# Patient Record
Sex: FEMALE | Race: WHITE | NOT HISPANIC OR LATINO | Employment: OTHER | URBAN - METROPOLITAN AREA
[De-identification: names, ages, dates, MRNs, and addresses within clinical notes are randomized per-mention and may not be internally consistent; named-entity substitution may affect disease eponyms.]

---

## 2017-02-13 ENCOUNTER — TELEPHONE (OUTPATIENT)
Dept: RHEUMATOLOGY | Facility: CLINIC | Age: 42
End: 2017-02-13

## 2017-02-13 NOTE — TELEPHONE ENCOUNTER
Returned patient's call. Patient states she is in a lot of joint pain. Wants to start cosentynx and methotrexate. Will discuss with Dr. Montaño and return patient's call.

## 2017-02-17 ENCOUNTER — TELEPHONE (OUTPATIENT)
Dept: RHEUMATOLOGY | Facility: CLINIC | Age: 42
End: 2017-02-17

## 2017-02-24 ENCOUNTER — TELEPHONE (OUTPATIENT)
Dept: RHEUMATOLOGY | Facility: CLINIC | Age: 42
End: 2017-02-24

## 2017-02-24 NOTE — TELEPHONE ENCOUNTER
Left message to inform pt lab work from last visit needs to be done prior to appt on Friday march 3. Advised to contact office with any questions.

## 2017-02-27 ENCOUNTER — TELEPHONE (OUTPATIENT)
Dept: RHEUMATOLOGY | Facility: CLINIC | Age: 42
End: 2017-02-27

## 2017-02-27 NOTE — TELEPHONE ENCOUNTER
----- Message from Carey Clark sent at 2/27/2017  3:08 PM CST -----  Pt called stated that she need to get a call back from the nurse stated that she needs to be seen before Friday/pls call back at [phone]

## 2017-02-27 NOTE — TELEPHONE ENCOUNTER
Returned pt call regarding needing to be seen sooner than Friday. Informed pt that nothing available other than scheduled appt. Pt verbalized understanding.

## 2017-03-01 ENCOUNTER — TELEPHONE (OUTPATIENT)
Dept: RHEUMATOLOGY | Facility: CLINIC | Age: 42
End: 2017-03-01

## 2017-03-01 NOTE — TELEPHONE ENCOUNTER
----- Message from Josie Simms sent at 2/23/2017  9:15 AM CST -----  Contact: Yanique Mcneill with [employer] [phone] is calling to say that the Cosentyx 150mg needs a prior authorization/please call [phone] for this prior authorization as soon as possible

## 2017-03-02 ENCOUNTER — TELEPHONE (OUTPATIENT)
Dept: RHEUMATOLOGY | Facility: CLINIC | Age: 42
End: 2017-03-02

## 2017-03-02 NOTE — TELEPHONE ENCOUNTER
Attempted to reach patient regarding cosentyx. No answer. lvm for patient to return clinic's call.

## 2017-03-02 NOTE — TELEPHONE ENCOUNTER
----- Message from Samia Morales sent at 3/2/2017 12:26 PM CST -----  Contact: Chelo with [employer]  Cherry with [employer] calling regarding Cosentyx. Chelo states the prescription should go through patient's /[employer] insurance. Please call Chelo at [phone] if any questions. Thanks!

## 2017-03-03 ENCOUNTER — OFFICE VISIT (OUTPATIENT)
Dept: RHEUMATOLOGY | Facility: CLINIC | Age: 42
End: 2017-03-03
Payer: COMMERCIAL

## 2017-03-03 VITALS
SYSTOLIC BLOOD PRESSURE: 113 MMHG | WEIGHT: 105 LBS | DIASTOLIC BLOOD PRESSURE: 75 MMHG | BODY MASS INDEX: 20.51 KG/M2 | HEART RATE: 101 BPM

## 2017-03-03 DIAGNOSIS — M35.1 MIXED CONNECTIVE TISSUE DISEASE: ICD-10-CM

## 2017-03-03 DIAGNOSIS — M45.9 ANKYLOSING SPONDYLITIS: Primary | ICD-10-CM

## 2017-03-03 DIAGNOSIS — L40.50 PSORIATIC ARTHRITIS: ICD-10-CM

## 2017-03-03 DIAGNOSIS — L40.0 PLAQUE PSORIASIS: ICD-10-CM

## 2017-03-03 PROCEDURE — 99999 PR PBB SHADOW E&M-EST. PATIENT-LVL III: CPT | Mod: PBBFAC,,, | Performed by: INTERNAL MEDICINE

## 2017-03-03 PROCEDURE — 99214 OFFICE O/P EST MOD 30 MIN: CPT | Mod: 25,S$GLB,, | Performed by: INTERNAL MEDICINE

## 2017-03-03 PROCEDURE — 1160F RVW MEDS BY RX/DR IN RCRD: CPT | Mod: S$GLB,,, | Performed by: INTERNAL MEDICINE

## 2017-03-03 RX ORDER — IBUPROFEN AND FAMOTIDINE 26.6; 8 MG/1; MG/1
1 TABLET ORAL 3 TIMES DAILY
Qty: 90 TABLET | Refills: 11 | Status: SHIPPED | OUTPATIENT
Start: 2017-03-03 | End: 2017-03-03 | Stop reason: SDUPTHER

## 2017-03-03 RX ORDER — ONDANSETRON 4 MG/1
TABLET, ORALLY DISINTEGRATING ORAL
COMMUNITY
Start: 2013-10-22 | End: 2017-08-25 | Stop reason: SDUPTHER

## 2017-03-03 RX ORDER — IBUPROFEN AND FAMOTIDINE 26.6; 8 MG/1; MG/1
1 TABLET ORAL 3 TIMES DAILY
Qty: 90 TABLET | Refills: 11 | Status: SHIPPED | OUTPATIENT
Start: 2017-03-03

## 2017-03-03 RX ORDER — IBUPROFEN 800 MG/1
800 TABLET ORAL EVERY 4 HOURS PRN
Qty: 120 TABLET | Refills: 3 | Status: CANCELLED | OUTPATIENT
Start: 2017-03-03 | End: 2017-04-02

## 2017-03-03 RX ORDER — METHOTREXATE 2.5 MG/1
25 TABLET ORAL
COMMUNITY
End: 2017-03-03 | Stop reason: SDUPTHER

## 2017-03-03 RX ORDER — OMEPRAZOLE 20 MG/1
20 CAPSULE, DELAYED RELEASE ORAL DAILY
Refills: 1 | COMMUNITY
Start: 2017-02-05

## 2017-03-03 RX ORDER — HYDROMORPHONE HYDROCHLORIDE 12 MG/1
12 TABLET, EXTENDED RELEASE ORAL EVERY 12 HOURS
Refills: 0 | COMMUNITY
Start: 2017-02-03 | End: 2017-12-14 | Stop reason: ALTCHOICE

## 2017-03-03 RX ORDER — IBUPROFEN 800 MG/1
800 TABLET ORAL EVERY 4 HOURS PRN
COMMUNITY
End: 2018-03-01

## 2017-03-03 RX ORDER — METHOTREXATE 2.5 MG/1
25 TABLET ORAL
Qty: 40 TABLET | Refills: 3 | Status: SHIPPED | OUTPATIENT
Start: 2017-03-03 | End: 2017-12-14 | Stop reason: ALTCHOICE

## 2017-03-03 ASSESSMENT — ROUTINE ASSESSMENT OF PATIENT INDEX DATA (RAPID3)
MDHAQ FUNCTION SCORE: 3
PATIENT GLOBAL ASSESSMENT SCORE: 6
PAIN SCORE: 10
TOTAL RAPID3 SCORE: 8.66
WHEN YOU AWAKENED IN THE MORNING OVER THE LAST WEEK, PLEASE INDICATE THE AMOUNT OF TIME IT TAKES UNTIL YOU ARE AS LIMBER AS YOU WILL BE FOR THE DAY: ALL DAY
PSYCHOLOGICAL DISTRESS SCORE: 4.4
AM STIFFNESS SCORE: 1, YES
FATIGUE SCORE: 6

## 2017-03-03 NOTE — MR AVS SNAPSHOT
[facility]  [address] [facility] [address] [place] [address]  Phone: [phone]  Fax: [phone]                  Holli Kurzt   3/3/2017 9:00 AM   Office Visit    Description:  Female : 1975   Provider:  Karson Montaño MD   Department:  [facility]           Reason for Visit     Follow-up           Diagnoses this Visit        Comments    Ankylosing spondylitis    -  Primary     Mixed connective tissue disease         Psoriatic arthritis         Plaque psoriasis                To Do List           Goals (5 Years of Data)     None       These Medications        Disp Refills Start End    methotrexate 2.5 MG Tab 40 tablet 3 3/3/2017 2017    Take 10 tablets (25 mg total) by mouth every 7 days. - Oral    Pharmacy: [facility] [place] - [address] Ph #: [phone]       secukinumab (COSENTYX PEN) 150 mg/mL PnIj 8 mL 1 3/3/2017     Inject 300 mg into the skin every 7 days. - Subcutaneous    Pharmacy: [facility] [place] - [address] Ph #: [phone]       ibuprofen-famotidine (DUEXIS) 800-26.6 mg Tab 90 tablet 11 3/3/2017     Take 1 tablet by mouth 3 (three) times daily. - Oral    [facility] [place] - [address] Ph #: [phone]         [facility]     [facility] -  Assistance  Registered nurses in the [facility] provide clinical advisement, health education, appointment booking, and other advisory services.  Call for this free service at [phone].             Medications           Message regarding Medications     Verify the changes and/or additions to your medication regime listed below are the same as discussed with your clinician today.  If any of these changes or additions are incorrect, please notify your healthcare provider.        START taking these NEW medications        Refills    methotrexate 2.5 MG Tab 3    Sig: Take 10 tablets (25 mg total) by mouth  every 7 days.    Class: Normal    Route: Oral    secukinumab (COSENTYX PEN) 150 mg/mL PnIj 1    Sig: Inject 300 mg into the skin every 7 days.    Class: Print    Route: Subcutaneous    ibuprofen-famotidine (DUEXIS) 800-26.6 mg Tab 11    Sig: Take 1 tablet by mouth 3 (three) times daily.    Class: Normal    Route: Oral      STOP taking these medications     BELBUCA 300 mcg Film APPLY ONE FILM IN MOUTH Q 12 H    diphenhydrAMINE injection 50 mg     folic acid (FOLVITE) 1 MG tablet Take 1 tablet (1 mg total) by mouth once daily.    furosemide (LASIX) 20 MG tablet Take 1 tablet (20 mg total) by mouth once daily.    ketorolac (TORADOL) 60 mg/2 mL Soln Inject 1ml into the muscle every 14 days    leflunomide (ARAVA) 20 MG Tab Take 1 tablet (20 mg total) by mouth once daily.    levocetirizine (XYZAL) 5 MG tablet Take 1 tablet (5 mg total) by mouth every evening.    secukinumab 150 mg/mL Syrg Inject 300 mg into the skin every 7 days.           Verify that the below list of medications is an accurate representation of the medications you are currently taking.  If none reported, the list may be blank. If incorrect, please contact your healthcare provider. Carry this list with you in case of emergency.           Current Medications     ARMOUR THYROID 180 mg Tab Take 180 mg by mouth once daily.    buPROPion (WELLBUTRIN XL) 300 MG 24 hr tablet Take 300 mg by mouth once daily.    fluconazole (DIFLUCAN) 150 MG Tab TK 1 T PO UTD    HYDROmorphone (DILAUDID) 8 MG tablet 8 mg every 4 (four) hours as needed.     methotrexate 2.5 MG Tab Take 10 tablets (25 mg total) by mouth every 7 days.    ondansetron (ZOFRAN-ODT) 4 MG TbDL 1-2 tabs q 6 hrs.    PROGESTERONE,MICRONIZED (PROGESTERONE, BULK,) 100 % Powd C- BIEST(6 4)-PROG-TEST-DHE    promethazine (PHENERGAN) 25 MG tablet every 4 (four) hours as needed.     DUEXIS 800-26.6 mg Tab TK 1 T PO Q 8 H PRN    hydromorphone 12 mg Tb24 Take 12 mg by mouth every 12 (twelve) hours.    ibuprofen  (ADVIL,MOTRIN) 800 MG tablet Take 800 mg by mouth every 4 (four) hours as needed for Pain.    ibuprofen-famotidine (DUEXIS) 800-26.6 mg Tab Take 1 tablet by mouth 3 (three) times daily.    omeprazole (PRILOSEC) 20 MG capsule Take 20 mg by mouth once daily.    secukinumab (COSENTYX PEN) 150 mg/mL PnIj Inject 300 mg into the skin every 7 days.           Clinical Reference Information           Your Vitals Were     BP Pulse Weight BMI       113/75 (BP Location: Left arm, Patient Position: Sitting, BP Method: Automatic) 101 47.6 kg (105 lb) 20.51 kg/m2       Blood Pressure          Most Recent Value    BP  113/75      Allergies as of 3/3/2017     Ativan [Lorazepam]    Latex, Natural Rubber    Versed [Midazolam]    Doxycycline    Remicade [Infliximab]    Sulfamethoxazole-trimethoprim    Vancomycin Analogues    Decadron [Dexamethasone Sodium Phosphate]    Medrol [Methylprednisolone]    Methylprednisolone Sod Phos    Prednisone      Immunizations Administered on Date of Encounter - 3/3/2017     None      Language Assistance Services     ATTENTION: Language assistance services are available, free of charge. Please call [phone].      ATENCIÓN: Si javier weems, tiene a ferreira disposición servicios gratuitos de asistencia lingüística. Llame al [phone].     [facility] Ý: N?u b?n nói Ti?ng Vi?t, có các d?ch v? h? tr? ngôn ng? mi?n phí dành cho b?n. G?i s? [phone].         [facility] complies with applicable Federal civil rights laws and does not discriminate on the basis of race, color, national origin, age, disability, or sex.

## 2017-03-03 NOTE — PROGRESS NOTES
Subjective:       Patient ID: Holli Kurtz is a 41 y.o. female.    Chief Complaint: Follow-up    HPI Comments:  Follow up:  psa and AS and plac psoriasis by biopsy. is flaring especially wrist B, weight has stabilized and though having family issues she is in good spirits.Patient complains of arthralgias and myalgias for which has been present for a few years. Pain is located in multiple joints, both shoulder(s), both elbow(s), both wrist(s), both MCP(s): 1st, 2nd, 3rd, 4th and 5th, both PIP(s): 1st, 2nd, 3rd, 4th and 5th, both DIP(s): 1st and 2nd, both hip(s), both knee(s) and both MTP(s): 1st, 2nd, 3rd, 4th and 5th, is described as aching, pulsating, shooting and throbbing, and is constant, moderate .  Associated symptoms include: crepitation, decreased range of motion, edema, effusion, tenderness and warmth.                  Treatments tried: enbrel, humira, remicaide,      Review of Systems   Constitutional: Positive for activity change. Negative for appetite change, chills, diaphoresis and unexpected weight change.   HENT: Negative for congestion, dental problem, ear discharge, ear pain, facial swelling, mouth sores, nosebleeds, postnasal drip, rhinorrhea, sinus pressure, sneezing, sore throat, tinnitus and voice change.    Eyes: Negative for photophobia, pain, discharge, redness and itching.   Respiratory: Negative for apnea, cough, chest tightness, shortness of breath and wheezing.    Cardiovascular: Positive for leg swelling. Negative for chest pain and palpitations.   Gastrointestinal: Negative for abdominal distention, abdominal pain, constipation, diarrhea, nausea and vomiting.   Endocrine: Negative for cold intolerance, heat intolerance, polydipsia and polyuria.   Genitourinary: Negative for decreased urine volume, difficulty urinating, flank pain, frequency, hematuria and urgency.   Musculoskeletal: Positive for arthralgias, back pain, gait problem and neck pain. Negative for neck stiffness.    Skin: Negative for pallor, rash and wound.   Allergic/Immunologic: Negative for immunocompromised state.   Neurological: Negative for dizziness, tremors, weakness and numbness.   Hematological: Negative for adenopathy. Does not bruise/bleed easily.   Psychiatric/Behavioral: Negative for sleep disturbance. The patient is not nervous/anxious.          Objective:     /75 (BP Location: Left arm, Patient Position: Sitting, BP Method: Automatic)  Pulse 101  Wt 47.6 kg (105 lb)  BMI 20.51 kg/m2     Physical Exam   Nursing note and vitals reviewed.  Constitutional: She is oriented to person, place, and time. She appears distressed.   HENT:   Head: Normocephalic and atraumatic.   Right Ear: External ear normal.   Mouth/Throat: Oropharynx is clear and moist.   Eyes: EOM are normal. Pupils are equal, round, and reactive to light.   Neck: Neck supple. No thyromegaly present.   Cardiovascular: Normal rate, regular rhythm and normal heart sounds.  Exam reveals no gallop and no friction rub.    No murmur heard.  Pulmonary/Chest: Breath sounds normal. She has no wheezes. She has no rales. She exhibits no tenderness.   Abdominal: There is no tenderness. There is no rebound and no guarding.       Right Side Rheumatological Exam     Examination finds the elbow, 1st MCP, 2nd MCP, 3rd MCP, 4th MCP and 5th MCP normal.    The patient is tender to palpation of the shoulder, elbow, wrist, knee, 1st PIP, 1st MCP, 2nd PIP, 2nd MCP, 3rd PIP, 3rd MCP, 4th PIP, 4th MCP, 5th PIP and 5th MCP    She has swelling of the knee, 1st PIP, 1st MCP, 2nd PIP, 2nd MCP, 3rd PIP, 3rd MCP, 4th PIP, 4th MCP, 5th PIP and 5th MCP    The patient has an enlarged wrist, 1st PIP, 2nd PIP, 3rd PIP, 4th PIP and 5th PIP    Shoulder Exam   Tenderness Location: acromion    Range of Motion   Active Abduction: abnormal   Adduction: abnormal  Sensation: normal    Knee Exam   Tenderness Location: medial joint line  Patellofemoral Crepitus: positive  Effusion:  positive  Sensation: normal    Hip Exam   Tenderness Location: no tenderness  Sensation: normal    Elbow/Wrist Exam   Tenderness Location: no tenderness  Sensation: normal    Foot Exam   Right foot exam exhibits signs of inflamed dorsum  Right foot exam exhibits signs of no podagra, no tophus and no plantar fasciitis    Muscle Strength (0-5 scale):  : 4/5     Left Side Rheumatological Exam     Examination finds the elbow, knee, 1st MCP, 2nd MCP, 3rd MCP, 4th MCP and 5th MCP normal.    The patient is tender to palpation of the shoulder, elbow, wrist, knee, 1st PIP, 1st MCP, 2nd PIP, 2nd MCP, 3rd PIP, 3rd MCP, 4th PIP, 4th MCP, 5th PIP and 5th MCP.    She has swelling of the 1st PIP, 1st MCP, 2nd PIP, 2nd MCP, 3rd PIP, 3rd MCP, 4th PIP, 4th MCP, 5th PIP and 5th MCP    The patient has an enlarged wrist, 1st PIP, 2nd PIP, 3rd PIP, 4th PIP and 5th PIP.    Shoulder Exam   Tenderness Location: acromion    Range of Motion   Active Abduction: abnormal   Sensation: normal    Knee Exam   Tenderness Location: lateral joint line and medial joint line    Patellofemoral Crepitus: positive  Effusion: positive  Sensation: normal    Hip Exam   Tenderness Location: no tenderness  Sensation: normal    Elbow/Wrist Exam   Tenderness Location: lateral epicondyle and medial epicondyle  Sensation: normal    Foot Exam   Left foot exam exhibits signs of inflamed dorsum  Left foot exam exhibits signs of no podagra and no plantar fasciitis    Muscle Strength (0-5 scale):  :  4/5       Back/Neck Exam   General Inspection   Gait: normal       Tenderness Right paramedian tenderness of the Lower C-Spine and Lower L-Spine.Left paramedian tenderness of the Upper C-Spine and Lower L-Spine.      Comments:  15 out of 18 tender points    Lymphadenopathy:     She has no cervical adenopathy.   Neurological: She is alert and oriented to person, place, and time.   Reflex Scores:       Patellar reflexes are 3+ on the right side and 3+ on the left  side.  Skin: No rash noted. No erythema. No pallor.     Psychiatric: Mood and affect normal.   Musculoskeletal: She exhibits edema, tenderness and deformity.   ra changes on hands pips and B wrist              hla b 27 pos,  Ankit 1;320            Assessment:         Encounter Diagnoses   Name Primary?    Ankylosing spondylitis Yes    Mixed connective tissue disease     Psoriatic arthritis     Plaque psoriasis          Plan:   Ankylosing spondylitis  -     methotrexate 2.5 MG Tab; Take 10 tablets (25 mg total) by mouth every 7 days.  Dispense: 40 tablet; Refill: 3  -     secukinumab (COSENTYX PEN) 150 mg/mL PnIj; Inject 300 mg into the skin every 7 days.  Dispense: 8 mL; Refill: 1  -     Discontinue: ibuprofen-famotidine (DUEXIS) 800-26.6 mg Tab; Take 1 tablet by mouth 3 (three) times daily.  Dispense: 90 tablet; Refill: 11  -     ibuprofen-famotidine (DUEXIS) 800-26.6 mg Tab; Take 1 tablet by mouth 3 (three) times daily.  Dispense: 90 tablet; Refill: 11  -     ANKIT; Future; Expected date: 3/3/17  -     Anti Sm/RNP Antibody; Future; Expected date: 3/3/17  -     Sjogrens syndrome-A extractable nuclear antibody; Future; Expected date: 3/3/17  -     Anti-scleroderma antibody; Future; Expected date: 3/3/17  -     Anti-Smith antibody; Future; Expected date: 3/3/17  -     Anti-smooth muscle antibody; Future; Expected date: 3/3/17  -     ANTI-HISTONE ANTIBODY; Future; Expected date: 3/3/17  -     ANTI-SSB ANTIBODY; Future; Expected date: 3/3/17  -     secukinumab (COSENTYX, 2 SYRINGES,) 150 mg/mL Syrg; Inject 300 mg into the skin once a week.  Dispense: 2 Syringe; Refill: 0    Mixed connective tissue disease  -     methotrexate 2.5 MG Tab; Take 10 tablets (25 mg total) by mouth every 7 days.  Dispense: 40 tablet; Refill: 3  -     secukinumab (COSENTYX PEN) 150 mg/mL PnIj; Inject 300 mg into the skin every 7 days.  Dispense: 8 mL; Refill: 1  -     Discontinue: ibuprofen-famotidine (DUEXIS) 800-26.6 mg Tab; Take 1 tablet by  mouth 3 (three) times daily.  Dispense: 90 tablet; Refill: 11  -     ibuprofen-famotidine (DUEXIS) 800-26.6 mg Tab; Take 1 tablet by mouth 3 (three) times daily.  Dispense: 90 tablet; Refill: 11  -     ANKIT; Future; Expected date: 3/3/17  -     Anti Sm/RNP Antibody; Future; Expected date: 3/3/17  -     Sjogrens syndrome-A extractable nuclear antibody; Future; Expected date: 3/3/17  -     Anti-scleroderma antibody; Future; Expected date: 3/3/17  -     Anti-Smith antibody; Future; Expected date: 3/3/17  -     Anti-smooth muscle antibody; Future; Expected date: 3/3/17  -     ANTI-HISTONE ANTIBODY; Future; Expected date: 3/3/17  -     ANTI-SSB ANTIBODY; Future; Expected date: 3/3/17  -     secukinumab (COSENTYX, 2 SYRINGES,) 150 mg/mL Syrg; Inject 300 mg into the skin once a week.  Dispense: 2 Syringe; Refill: 0    Psoriatic arthritis  -     Discontinue: ibuprofen-famotidine (DUEXIS) 800-26.6 mg Tab; Take 1 tablet by mouth 3 (three) times daily.  Dispense: 90 tablet; Refill: 11  -     ibuprofen-famotidine (DUEXIS) 800-26.6 mg Tab; Take 1 tablet by mouth 3 (three) times daily.  Dispense: 90 tablet; Refill: 11  -     ANKIT; Future; Expected date: 3/3/17  -     Anti Sm/RNP Antibody; Future; Expected date: 3/3/17  -     Sjogrens syndrome-A extractable nuclear antibody; Future; Expected date: 3/3/17  -     Anti-scleroderma antibody; Future; Expected date: 3/3/17  -     Anti-Smith antibody; Future; Expected date: 3/3/17  -     Anti-smooth muscle antibody; Future; Expected date: 3/3/17  -     ANTI-HISTONE ANTIBODY; Future; Expected date: 3/3/17  -     ANTI-SSB ANTIBODY; Future; Expected date: 3/3/17  -     secukinumab (COSENTYX, 2 SYRINGES,) 150 mg/mL Syrg; Inject 300 mg into the skin once a week.  Dispense: 2 Syringe; Refill: 0    Plaque psoriasis  -     ANKIT; Future; Expected date: 3/3/17  -     Anti Sm/RNP Antibody; Future; Expected date: 3/3/17  -     Sjogrens syndrome-A extractable nuclear antibody; Future; Expected date:  3/3/17  -     Anti-scleroderma antibody; Future; Expected date: 3/3/17  -     Anti-Smith antibody; Future; Expected date: 3/3/17  -     Anti-smooth muscle antibody; Future; Expected date: 3/3/17  -     ANTI-HISTONE ANTIBODY; Future; Expected date: 3/3/17  -     ANTI-SSB ANTIBODY; Future; Expected date: 3/3/17  -     secukinumab (COSENTYX, 2 SYRINGES,) 150 mg/mL Syrg; Inject 300 mg into the skin once a week.  Dispense: 2 Syringe; Refill: 0    Other orders  -     Cancel: ibuprofen (ADVIL,MOTRIN) 800 MG tablet; Take 1 tablet (800 mg total) by mouth every 4 (four) hours as needed for Pain.  Dispense: 120 tablet; Refill: 3     Decrease ivig, dc acthar ivp, anna xeljanz started injectable actemera and will switch to iv actemera  Concerned about her anemia

## 2017-03-21 ENCOUNTER — TELEPHONE (OUTPATIENT)
Dept: INFUSION THERAPY | Facility: HOSPITAL | Age: 42
End: 2017-03-21

## 2017-03-21 ENCOUNTER — TELEPHONE (OUTPATIENT)
Dept: RHEUMATOLOGY | Facility: CLINIC | Age: 42
End: 2017-03-21

## 2017-03-21 DIAGNOSIS — D50.9 IRON DEFICIENCY ANEMIA, UNSPECIFIED IRON DEFICIENCY ANEMIA TYPE: ICD-10-CM

## 2017-03-21 RX ORDER — SODIUM CHLORIDE 0.9 % (FLUSH) 0.9 %
10 SYRINGE (ML) INJECTION
Status: CANCELLED | OUTPATIENT
Start: 2017-03-21

## 2017-03-21 RX ORDER — HEPARIN SODIUM (PORCINE) LOCK FLUSH IV SOLN 100 UNIT/ML 100 UNIT/ML
100 SOLUTION INTRAVENOUS
Status: CANCELLED | OUTPATIENT
Start: 2017-03-21

## 2017-03-21 NOTE — TELEPHONE ENCOUNTER
Dr. Montaño placed order for iron infusion. We need to get pt in asap.     Thanks,    Axel Marvin  [id number]

## 2017-03-21 NOTE — TELEPHONE ENCOUNTER
----- Message from Karson Montaño MD sent at 3/21/2017  8:02 AM CDT -----  Your h/h very lo w , you need iron, i will set it up and inform Dr Bellamy

## 2017-03-21 NOTE — TELEPHONE ENCOUNTER
Spoke to patient verbally, advised of results and to expect a call from infusion. Pt verbalized understanding.

## 2017-03-21 NOTE — TELEPHONE ENCOUNTER
Pt advised that we received approval and that currently  the next available appt we have is 3/27, if we have any cancellations I will call to get her in sooner.  Pt verbalized understanding

## 2017-03-21 NOTE — TELEPHONE ENCOUNTER
Pt called to see why she hasn't been called about scheduling her iron infusion as she was under the impression that she needed to come in today. Pt advised that the order was entered this morning and is still pending insurance approval. Pt stated she would call her insurance company.

## 2017-03-22 NOTE — TELEPHONE ENCOUNTER
Pt scheduled for infusion tomorrow 3/23 at 10:30am, Aylin Corcoran stated she would call the pt and let her know about appt

## 2017-03-22 NOTE — TELEPHONE ENCOUNTER
----- Message from Alberto Aguila sent at 3/21/2017  4:08 PM CDT -----  Contact: same  Unsuccessful call placed to pod.  Patient called in and wanted to let Dr. Montaño know that she has not gotten a call back about scheduling her STAT iron infusion.    Patient call back number is [phone]

## 2017-03-22 NOTE — TELEPHONE ENCOUNTER
Returned patient's call. Attempted to contact [facility] infusion department to get iron infusion at a sooner date.

## 2017-03-23 ENCOUNTER — INFUSION (OUTPATIENT)
Dept: INFUSION THERAPY | Facility: HOSPITAL | Age: 42
End: 2017-03-23
Attending: INTERNAL MEDICINE
Payer: COMMERCIAL

## 2017-03-23 VITALS
SYSTOLIC BLOOD PRESSURE: 137 MMHG | DIASTOLIC BLOOD PRESSURE: 79 MMHG | BODY MASS INDEX: 20.53 KG/M2 | RESPIRATION RATE: 17 BRPM | HEART RATE: 90 BPM | TEMPERATURE: 99 F | WEIGHT: 105.13 LBS

## 2017-03-23 DIAGNOSIS — D83.9 CVID (COMMON VARIABLE IMMUNODEFICIENCY): ICD-10-CM

## 2017-03-23 DIAGNOSIS — R11.0 NAUSEA: Primary | ICD-10-CM

## 2017-03-23 DIAGNOSIS — D50.9 IRON DEFICIENCY ANEMIA, UNSPECIFIED IRON DEFICIENCY ANEMIA TYPE: ICD-10-CM

## 2017-03-23 DIAGNOSIS — M06.9 RHEUMATOID ARTHRITIS OF HAND, UNSPECIFIED LATERALITY, UNSPECIFIED RHEUMATOID FACTOR PRESENCE: ICD-10-CM

## 2017-03-23 DIAGNOSIS — M33.20 IDIOPATHIC POLYMYOSITIS: ICD-10-CM

## 2017-03-23 PROCEDURE — 96376 TX/PRO/DX INJ SAME DRUG ADON: CPT | Mod: PN

## 2017-03-23 PROCEDURE — 63600175 PHARM REV CODE 636 W HCPCS: Mod: PN | Performed by: INTERNAL MEDICINE

## 2017-03-23 PROCEDURE — 25000003 PHARM REV CODE 250: Mod: PN | Performed by: INTERNAL MEDICINE

## 2017-03-23 PROCEDURE — 96365 THER/PROPH/DIAG IV INF INIT: CPT | Mod: PN

## 2017-03-23 RX ORDER — SODIUM CHLORIDE 0.9 % (FLUSH) 0.9 %
10 SYRINGE (ML) INJECTION
Status: CANCELLED | OUTPATIENT
Start: 2017-03-23

## 2017-03-23 RX ORDER — ONDANSETRON 2 MG/ML
8 INJECTION INTRAMUSCULAR; INTRAVENOUS ONCE
Status: COMPLETED | OUTPATIENT
Start: 2017-03-23 | End: 2017-03-23

## 2017-03-23 RX ORDER — SODIUM CHLORIDE 0.9 % (FLUSH) 0.9 %
10 SYRINGE (ML) INJECTION
Status: DISCONTINUED | OUTPATIENT
Start: 2017-03-23 | End: 2017-03-23 | Stop reason: HOSPADM

## 2017-03-23 RX ORDER — HEPARIN SODIUM (PORCINE) LOCK FLUSH IV SOLN 100 UNIT/ML 100 UNIT/ML
100 SOLUTION INTRAVENOUS
Status: CANCELLED | OUTPATIENT
Start: 2017-03-23

## 2017-03-23 RX ADMIN — ONDANSETRON 8 MG: 2 INJECTION, SOLUTION INTRAMUSCULAR; INTRAVENOUS at 02:03

## 2017-03-23 RX ADMIN — SODIUM CHLORIDE, PRESERVATIVE FREE 10 ML: 5 INJECTION INTRAVENOUS at 11:03

## 2017-03-23 RX ADMIN — IRON SUCROSE 100 MG: 20 INJECTION, SOLUTION INTRAVENOUS at 12:03

## 2017-03-23 NOTE — PLAN OF CARE
Problem: Patient Care Overview  Goal: Plan of Care Review  Outcome: Ongoing (interventions implemented as appropriate)  Pt. Completed treatment, tolerated without noted distress. Pt admits to some continuing nausea but reports at a minimal amount.Vtial signs stable. Patient discharged from infusion center via WC with caregiver. Pt to contact Dr. Montaño tomorrow if nausea persists. Patient had all present questions answered.

## 2017-03-23 NOTE — NURSING
1340 Pt reports feeling nauseated. Infusion stopped, vital signs taken and blue bag provided to pt. Cool cloth to back of neck provided to pt. Pt denies any abdominal or back pain. Denies CP/ SOB, dizziness. No noted rash.  1345 Pt states she is feeling less nauseated and continues to sit on edge of chair.  1355 Phoned and reported episode on nausea , vital signs and assessment to Dr. Montaño's office. Phone order for zofran.

## 2017-03-27 ENCOUNTER — TELEPHONE (OUTPATIENT)
Dept: RHEUMATOLOGY | Facility: CLINIC | Age: 42
End: 2017-03-27

## 2017-03-27 ENCOUNTER — INFUSION (OUTPATIENT)
Dept: INFUSION THERAPY | Facility: HOSPITAL | Age: 42
End: 2017-03-27
Attending: INTERNAL MEDICINE
Payer: COMMERCIAL

## 2017-03-27 VITALS
DIASTOLIC BLOOD PRESSURE: 76 MMHG | TEMPERATURE: 99 F | BODY MASS INDEX: 20.55 KG/M2 | SYSTOLIC BLOOD PRESSURE: 122 MMHG | HEART RATE: 90 BPM | WEIGHT: 105.19 LBS | RESPIRATION RATE: 16 BRPM

## 2017-03-27 DIAGNOSIS — D50.9 IRON DEFICIENCY ANEMIA, UNSPECIFIED IRON DEFICIENCY ANEMIA TYPE: Primary | ICD-10-CM

## 2017-03-27 DIAGNOSIS — M06.9 RHEUMATOID ARTHRITIS OF HAND, UNSPECIFIED LATERALITY, UNSPECIFIED RHEUMATOID FACTOR PRESENCE: ICD-10-CM

## 2017-03-27 DIAGNOSIS — D83.9 CVID (COMMON VARIABLE IMMUNODEFICIENCY): ICD-10-CM

## 2017-03-27 DIAGNOSIS — M33.20 IDIOPATHIC POLYMYOSITIS: ICD-10-CM

## 2017-03-27 DIAGNOSIS — D64.9 ANEMIA, UNSPECIFIED TYPE: Primary | ICD-10-CM

## 2017-03-27 PROCEDURE — 25000003 PHARM REV CODE 250: Mod: PN | Performed by: INTERNAL MEDICINE

## 2017-03-27 PROCEDURE — 96365 THER/PROPH/DIAG IV INF INIT: CPT | Mod: PN

## 2017-03-27 PROCEDURE — 63600175 PHARM REV CODE 636 W HCPCS: Mod: PN | Performed by: INTERNAL MEDICINE

## 2017-03-27 RX ORDER — SODIUM CHLORIDE 0.9 % (FLUSH) 0.9 %
10 SYRINGE (ML) INJECTION
Status: DISCONTINUED | OUTPATIENT
Start: 2017-03-27 | End: 2017-03-27 | Stop reason: HOSPADM

## 2017-03-27 RX ORDER — ONDANSETRON 2 MG/ML
8 INJECTION INTRAMUSCULAR; INTRAVENOUS ONCE AS NEEDED
Status: CANCELLED
Start: 2017-03-28 | End: 2017-03-28

## 2017-03-27 RX ORDER — ONDANSETRON 2 MG/ML
8 INJECTION INTRAMUSCULAR; INTRAVENOUS ONCE AS NEEDED
Status: CANCELLED
Start: 2017-03-27 | End: 2017-03-27

## 2017-03-27 RX ORDER — HEPARIN SODIUM (PORCINE) LOCK FLUSH IV SOLN 100 UNIT/ML 100 UNIT/ML
100 SOLUTION INTRAVENOUS
Status: CANCELLED | OUTPATIENT
Start: 2017-03-27

## 2017-03-27 RX ORDER — HEPARIN 100 UNIT/ML
100 SYRINGE INTRAVENOUS
Status: DISCONTINUED | OUTPATIENT
Start: 2017-03-27 | End: 2017-03-27 | Stop reason: HOSPADM

## 2017-03-27 RX ORDER — SODIUM CHLORIDE 0.9 % (FLUSH) 0.9 %
10 SYRINGE (ML) INJECTION
Status: CANCELLED | OUTPATIENT
Start: 2017-03-27

## 2017-03-27 RX ADMIN — SODIUM CHLORIDE, PRESERVATIVE FREE 10 ML: 5 INJECTION INTRAVENOUS at 01:03

## 2017-03-27 RX ADMIN — SODIUM CHLORIDE: 0.9 INJECTION, SOLUTION INTRAVENOUS at 01:03

## 2017-03-27 RX ADMIN — IRON SUCROSE 100 MG: 20 INJECTION, SOLUTION INTRAVENOUS at 01:03

## 2017-03-27 NOTE — MR AVS SNAPSHOT
Patient Information     Patient Name Sex Holli Cooney Female 1975      Visit Information        Provider Department Dept Phone Center    3/27/2017 12:30 PM CHAIR 16, [facility] [phone] [facility]      Patient Instructions     None      Your Current Medications Are     ARMOUR THYROID 180 mg Tab    buPROPion (WELLBUTRIN XL) 300 MG 24 hr tablet    DUEXIS 800-26.6 mg Tab    fluconazole (DIFLUCAN) 150 MG Tab    HYDROmorphone (DILAUDID) 8 MG tablet    hydromorphone 12 mg Tb24    ibuprofen (ADVIL,MOTRIN) 800 MG tablet    ibuprofen-famotidine (DUEXIS) 800-26.6 mg Tab    methotrexate 2.5 MG Tab    omeprazole (PRILOSEC) 20 MG capsule    ondansetron (ZOFRAN-ODT) 4 MG TbDL    PROGESTERONE,MICRONIZED (PROGESTERONE, BULK,) 100 % Powd    promethazine (PHENERGAN) 25 MG tablet    secukinumab (COSENTYX PEN) 150 mg/mL PnIj      Facility-Administered Medications     heparin, porcine (PF) 100 unit/mL injection flush 100 Units    iron sucrose (VENOFER) 100 mg in sodium chloride 0.9% 100 mL IVPB    sodium chloride 0.9% 100 mL flush bag    sodium chloride 0.9% flush 10 mL      Appointments for Next Year     2017 10:00 AM ESTABLISHED PATIENT (30 min.) [facility] Karson Montaño MD    Arrive at check-in approximately 15 minutes before your scheduled appointment time. Bring all outside medical records and imaging, along with a list of your current medications and insurance card.         Default Flowsheet Data (last 24 hours)      Amb Complex Vitals Severo        17 1258                Measurements    Weight 47.7 kg (105 lb 3.2 oz)        /78        Temp 98.5 °F (36.9 °C)        Pulse 92        Resp 16        Pain Assessment    Pain Score Eight        Pain Frequency 2 Continuous        Pain Loc GENERALIZED   every joint in body                Allergies     Ativan [Lorazepam] Other (See Comments)    hallucinations    Latex, Natural Rubber Other  (See Comments)    blisters    Versed [Midazolam] Anxiety    Hyperactive behavior    Doxycycline     Remicade [Infliximab] Diarrhea, Nausea And Vomiting, Other (See Comments)    Severe abdominal cramping.    Sulfamethoxazole-trimethoprim     Vancomycin Analogues     Decadron [Dexamethasone Sodium Phosphate] Palpitations     tachy    Medrol [Methylprednisolone] Palpitations    Methylprednisolone Sod Phos Palpitations    Prednisone Palpitations    tachycardia      Medications You Received from 03/26/2017 1513 to 03/27/2017 1513        Date/Time Order Dose Route Action     03/27/2017 1319 iron sucrose (VENOFER) 100 mg in sodium chloride 0.9% 100 mL IVPB 100 mg Intravenous New Bag     03/27/2017 1319 sodium chloride 0.9% 100 mL flush bag   Intravenous New Bag     03/27/2017 1319 sodium chloride 0.9% flush 10 mL 10 mL Intravenous Given      Current Discharge Medication List     Cannot display discharge medications since this is not an admission.

## 2017-03-27 NOTE — NURSING
Sully Hdez 1:39 PM:   Hi there! I have alonzo tejada mrn [id number] here today for venofer. She got really sick with last venofer can I prophylactically give her zofran 8mg IV push? Also they want to know if she needs her Cbc rechecked bc her hgb and hct are down from 3/20       Shavonne Miramontes 1:41 PM:   I'm trying to reach Danyel now.   Sully Hdez 1:42 PM:   thanks   Shavonne Miramontes 2:19 PM:   did someone get back with you on her?   Sully Hdez 2:31 PM:   No debbie   Shavonne Miramontes 3:01 PM:   Ok to give. Danyel put the orders in. yes, reccheck cbc.   Sully Hdez 3:04 PM:   the pt just got out of my chair. Would you like me to call her to come back for cbc?   3:04 PM This message wasn't sent to Shavonne Miramontes.   the pt just got out of my chair. Would you like me to call her to come back for cbc?

## 2017-03-27 NOTE — PLAN OF CARE
Problem: Patient Care Overview  Goal: Plan of Care Review  Outcome: Ongoing (interventions implemented as appropriate)  Pt tolerated  infusion without any difficulty. Instructed pt to call for questions or concerns and to return to clinic as directed for next treatment. Pt verbalized understanding. AVS printed and schedule given to pt

## 2017-03-29 ENCOUNTER — TELEPHONE (OUTPATIENT)
Dept: RHEUMATOLOGY | Facility: CLINIC | Age: 42
End: 2017-03-29

## 2017-03-29 DIAGNOSIS — D50.9 IRON DEFICIENCY ANEMIA, UNSPECIFIED IRON DEFICIENCY ANEMIA TYPE: Primary | ICD-10-CM

## 2017-03-29 NOTE — TELEPHONE ENCOUNTER
Spoke with patient and message and instructions given. Patient states that she had her infusion on Monday, has an appointment with Dr. Bellamy on 04/10/17 and will have her labs drawn at [facility]. Orders are in the system.

## 2017-03-29 NOTE — TELEPHONE ENCOUNTER
Spoke with patient and states that she had her Infusion on Monday and needs an order for a CBC at [facility].

## 2017-04-10 PROBLEM — D89.2 PARAPROTEINEMIA: Status: ACTIVE | Noted: 2017-04-10

## 2017-04-10 NOTE — TELEPHONE ENCOUNTER
----- Message from Laya Singh sent at 4/10/2017  1:07 PM CDT -----  Patient stated she is having reaction to medication/stated has rash all over arms,chest and face/needs advice/please call back at [phone] to advise.

## 2017-04-10 NOTE — TELEPHONE ENCOUNTER
Medrol dosepack called to pharmacy. Patient instructed to hold cosentynx and to take benadryl and medrol dosepack. Also to [facility].

## 2017-04-11 ENCOUNTER — PATIENT MESSAGE (OUTPATIENT)
Dept: RHEUMATOLOGY | Facility: CLINIC | Age: 42
End: 2017-04-11

## 2017-04-11 RX ORDER — METHYLPREDNISOLONE 4 MG/1
TABLET ORAL
Qty: 1 PACKAGE | Refills: 0 | Status: SHIPPED | OUTPATIENT
Start: 2017-04-11 | End: 2017-05-01

## 2017-04-28 ENCOUNTER — PATIENT MESSAGE (OUTPATIENT)
Dept: RHEUMATOLOGY | Facility: CLINIC | Age: 42
End: 2017-04-28

## 2017-04-29 ENCOUNTER — PATIENT MESSAGE (OUTPATIENT)
Dept: RHEUMATOLOGY | Facility: CLINIC | Age: 42
End: 2017-04-29

## 2017-05-08 PROBLEM — E61.1 IRON DEFICIENCY: Status: ACTIVE | Noted: 2017-05-08

## 2017-05-31 ENCOUNTER — TELEPHONE (OUTPATIENT)
Dept: RHEUMATOLOGY | Facility: CLINIC | Age: 42
End: 2017-05-31

## 2017-05-31 NOTE — TELEPHONE ENCOUNTER
----- Message from Josie Simms sent at 5/31/2017  1:21 PM CDT -----  Contact: Lindsay Montoya - Pharmacist with [facility] - [phone] is calling to clarify patient's prescription/pharmacist did not want to give me the name of the prescription/please call

## 2017-06-12 ENCOUNTER — PATIENT MESSAGE (OUTPATIENT)
Dept: RHEUMATOLOGY | Facility: CLINIC | Age: 42
End: 2017-06-12

## 2017-06-13 NOTE — TELEPHONE ENCOUNTER
Received appeal documentation. Faxed to [facility] pharmacy along with visit notes.

## 2017-06-15 ENCOUNTER — TELEPHONE (OUTPATIENT)
Dept: RHEUMATOLOGY | Facility: CLINIC | Age: 42
End: 2017-06-15

## 2017-06-15 NOTE — TELEPHONE ENCOUNTER
Returned [facility]'s call. Pharmacist states stelara is absolutely not covered at all. PA and Appeal is not an option.

## 2017-06-15 NOTE — TELEPHONE ENCOUNTER
----- Message from Carmen Bains sent at 6/15/2017 12:55 PM CDT -----  Contact: Nyla from [facility] [phone]  Call placed to pod Nyla Lemons called and asked for a call back about this patient. Thank you.

## 2017-06-21 ENCOUNTER — PATIENT MESSAGE (OUTPATIENT)
Dept: RHEUMATOLOGY | Facility: CLINIC | Age: 42
End: 2017-06-21

## 2017-07-25 ENCOUNTER — PATIENT MESSAGE (OUTPATIENT)
Dept: RHEUMATOLOGY | Facility: CLINIC | Age: 42
End: 2017-07-25

## 2017-07-25 DIAGNOSIS — M06.9 RHEUMATOID ARTHRITIS, INVOLVING UNSPECIFIED SITE, UNSPECIFIED RHEUMATOID FACTOR PRESENCE: Primary | ICD-10-CM

## 2017-07-27 NOTE — TELEPHONE ENCOUNTER
Before we start recycling old meds lets try the newer meds out there, we can try kevzara and if its not approved we can attempt to get it for free. It is more of a RA medication. You are anemic so methotrexate is not a good option but arava can be restarted. I will send the script tonight

## 2017-07-31 ENCOUNTER — OFFICE VISIT (OUTPATIENT)
Dept: RHEUMATOLOGY | Facility: CLINIC | Age: 42
End: 2017-07-31
Payer: COMMERCIAL

## 2017-07-31 VITALS
HEART RATE: 95 BPM | BODY MASS INDEX: 23.44 KG/M2 | WEIGHT: 120 LBS | DIASTOLIC BLOOD PRESSURE: 88 MMHG | SYSTOLIC BLOOD PRESSURE: 135 MMHG

## 2017-07-31 DIAGNOSIS — M45.9 ANKYLOSING SPONDYLITIS, UNSPECIFIED SITE OF SPINE: ICD-10-CM

## 2017-07-31 DIAGNOSIS — M06.9 RHEUMATOID ARTHRITIS, INVOLVING UNSPECIFIED SITE, UNSPECIFIED RHEUMATOID FACTOR PRESENCE: Primary | ICD-10-CM

## 2017-07-31 DIAGNOSIS — L40.50 PSORIATIC ARTHRITIS: ICD-10-CM

## 2017-07-31 DIAGNOSIS — F06.4 ANXIETY DISORDER DUE TO GENERAL MEDICAL CONDITION: ICD-10-CM

## 2017-07-31 PROCEDURE — 99999 PR PBB SHADOW E&M-EST. PATIENT-LVL III: CPT | Mod: PBBFAC,,, | Performed by: INTERNAL MEDICINE

## 2017-07-31 PROCEDURE — 3008F BODY MASS INDEX DOCD: CPT | Mod: S$GLB,,, | Performed by: INTERNAL MEDICINE

## 2017-07-31 PROCEDURE — 99215 OFFICE O/P EST HI 40 MIN: CPT | Mod: S$GLB,,, | Performed by: INTERNAL MEDICINE

## 2017-07-31 RX ORDER — HYDROXYZINE PAMOATE 25 MG/1
CAPSULE ORAL
Refills: 1 | COMMUNITY
Start: 2017-06-07 | End: 2020-03-23

## 2017-07-31 RX ORDER — HYDROCORTISONE 25 MG/G
CREAM TOPICAL
Refills: 0 | COMMUNITY
Start: 2017-05-15

## 2017-07-31 RX ORDER — DEXAMETHASONE 0.5 MG/1
0.5 TABLET ORAL DAILY
Qty: 30 TABLET | Refills: 1 | Status: SHIPPED | OUTPATIENT
Start: 2017-07-31 | End: 2017-08-30

## 2017-07-31 RX ORDER — KETOCONAZOLE 20 MG/ML
SHAMPOO, SUSPENSION TOPICAL
Refills: 0 | COMMUNITY
Start: 2017-05-15

## 2017-07-31 RX ORDER — BUPROPION HYDROCHLORIDE 150 MG/1
150 TABLET ORAL DAILY
Qty: 30 TABLET | Refills: 11 | Status: SHIPPED | OUTPATIENT
Start: 2017-07-31 | End: 2017-11-06 | Stop reason: DRUGHIGH

## 2017-07-31 RX ORDER — FLUOCINONIDE 0.5 MG/G
OINTMENT TOPICAL
Refills: 0 | COMMUNITY
Start: 2017-05-15 | End: 2020-03-23 | Stop reason: SDUPTHER

## 2017-07-31 RX ORDER — PROMETHAZINE HYDROCHLORIDE 25 MG/1
25 TABLET ORAL EVERY 4 HOURS PRN
Qty: 30 TABLET | Refills: 3 | Status: SHIPPED | OUTPATIENT
Start: 2017-07-31 | End: 2020-03-23 | Stop reason: SDUPTHER

## 2017-07-31 ASSESSMENT — ROUTINE ASSESSMENT OF PATIENT INDEX DATA (RAPID3)
FATIGUE SCORE: 6
PSYCHOLOGICAL DISTRESS SCORE: 4.4
PAIN SCORE: 10
AM STIFFNESS SCORE: 1, YES
TOTAL RAPID3 SCORE: 8
MDHAQ FUNCTION SCORE: 3
PATIENT GLOBAL ASSESSMENT SCORE: 4
WHEN YOU AWAKENED IN THE MORNING OVER THE LAST WEEK, PLEASE INDICATE THE AMOUNT OF TIME IT TAKES UNTIL YOU ARE AS LIMBER AS YOU WILL BE FOR THE DAY: ALL DAY

## 2017-07-31 NOTE — PROGRESS NOTES
Subjective:       Patient ID: Holli Kurtz is a 41 y.o. female.    Chief Complaint: Follow-up     Follow up: Ra, psa and AS and plac psoriasis  is flaring especially wrist B, she has noticed teeth shifting and bone growth. She has severe difficulty transferring from wheelchair to wheelchair with falls.  She is doing poorly. Patient complains of arthralgias and myalgias for which has been present for a few years. Pain is located in multiple joints, both shoulder(s), both elbow(s), both wrist(s), both MCP(s): 1st, 2nd, 3rd, 4th and 5th, both PIP(s): 1st, 2nd, 3rd, 4th and 5th, both DIP(s): 1st and 2nd, both hip(s), both knee(s) and both MTP(s): 1st, 2nd, 3rd, 4th and 5th, is described as aching, pulsating, shooting and throbbing, and is constant, moderate .  Associated symptoms include: crepitation, decreased range of motion, edema, effusion, tenderness and warmth.                She complains of joint swelling. Associated symptoms include fatigue and myalgias.     Treatments tried: enbrel, humira, remicaide,      Review of Systems   Constitutional: Positive for activity change and fatigue. Negative for appetite change, chills, diaphoresis and unexpected weight change.   HENT: Negative for congestion, dental problem, ear discharge, ear pain, facial swelling, mouth sores, nosebleeds, postnasal drip, rhinorrhea, sinus pressure, sneezing, sore throat, tinnitus and voice change.    Eyes: Negative for photophobia, pain, discharge, redness and itching.   Respiratory: Negative for apnea, cough, chest tightness, shortness of breath and wheezing.    Cardiovascular: Positive for leg swelling. Negative for chest pain and palpitations.   Gastrointestinal: Negative for abdominal distention, abdominal pain, constipation, diarrhea, nausea and vomiting.   Endocrine: Negative for cold intolerance, heat intolerance, polydipsia and polyuria.   Genitourinary: Negative for decreased urine volume, difficulty urinating, flank  pain, frequency, hematuria and urgency.   Musculoskeletal: Positive for arthralgias, back pain, gait problem, joint swelling, myalgias and neck pain. Negative for neck stiffness.   Skin: Negative for pallor, rash and wound.   Allergic/Immunologic: Negative for immunocompromised state.   Neurological: Negative for dizziness, tremors, weakness and numbness.   Hematological: Negative for adenopathy. Does not bruise/bleed easily.   Psychiatric/Behavioral: Negative for sleep disturbance. The patient is not nervous/anxious.          Objective:     /88 (BP Location: Left arm, Patient Position: Sitting, BP Method: Automatic)   Pulse 95   Wt 54.4 kg (120 lb)   BMI 23.44 kg/m²      Physical Exam   Nursing note and vitals reviewed.  Constitutional: She is oriented to person, place, and time. She appears distressed.   HENT:   Head: Normocephalic and atraumatic.   Right Ear: External ear normal.   Mouth/Throat: Oropharynx is clear and moist.   Eyes: EOM are normal. Pupils are equal, round, and reactive to light.   Neck: Neck supple. No thyromegaly present.   Cardiovascular: Normal rate, regular rhythm and normal heart sounds.  Exam reveals no gallop and no friction rub.    No murmur heard.  Pulmonary/Chest: Breath sounds normal. She has no wheezes. She has no rales. She exhibits no tenderness.   Abdominal: There is no tenderness. There is no rebound and no guarding.       Right Side Rheumatological Exam     Examination finds the elbow, 1st MCP, 2nd MCP, 3rd MCP, 4th MCP and 5th MCP normal.    The patient is tender to palpation of the shoulder, elbow, wrist, knee, 1st PIP, 1st MCP, 2nd PIP, 2nd MCP, 3rd PIP, 3rd MCP, 4th PIP, 4th MCP, 5th PIP and 5th MCP    She has swelling of the knee, 1st PIP, 1st MCP, 2nd PIP, 2nd MCP, 3rd PIP, 3rd MCP, 4th PIP, 4th MCP, 5th PIP and 5th MCP    The patient has an enlarged wrist, 1st PIP, 2nd PIP, 3rd PIP, 4th PIP and 5th PIP    Shoulder Exam   Tenderness Location: acromion    Range of  Motion   Active Abduction: abnormal   Adduction: abnormal  Sensation: normal    Knee Exam   Tenderness Location: medial joint line  Patellofemoral Crepitus: positive  Effusion: positive  Sensation: normal    Hip Exam   Tenderness Location: no tenderness  Sensation: normal    Elbow/Wrist Exam   Tenderness Location: no tenderness  Sensation: normal    Foot Exam   Right foot exam exhibits signs of inflamed dorsum  Right foot exam exhibits signs of no podagra, no tophus and no plantar fasciitis    Muscle Strength (0-5 scale):  : 4/5     Left Side Rheumatological Exam     Examination finds the elbow, knee, 1st MCP, 2nd MCP, 3rd MCP, 4th MCP and 5th MCP normal.    The patient is tender to palpation of the shoulder, elbow, wrist, knee, 1st PIP, 1st MCP, 2nd PIP, 2nd MCP, 3rd PIP, 3rd MCP, 4th PIP, 4th MCP, 5th PIP and 5th MCP.    She has swelling of the 1st PIP, 1st MCP, 2nd PIP, 2nd MCP, 3rd PIP, 3rd MCP, 4th PIP, 4th MCP, 5th PIP and 5th MCP    The patient has an enlarged wrist, 1st PIP, 2nd PIP, 3rd PIP, 4th PIP and 5th PIP.    Shoulder Exam   Tenderness Location: acromion    Range of Motion   Active Abduction: abnormal   Sensation: normal    Knee Exam   Tenderness Location: lateral joint line and medial joint line    Patellofemoral Crepitus: positive  Effusion: positive  Sensation: normal    Hip Exam   Tenderness Location: no tenderness  Sensation: normal    Elbow/Wrist Exam   Tenderness Location: lateral epicondyle and medial epicondyle  Sensation: normal    Foot Exam   Left foot exam exhibits signs of inflamed dorsum  Left foot exam exhibits signs of no podagra and no plantar fasciitis    Muscle Strength (0-5 scale):  :  4/5       Back/Neck Exam   General Inspection   Gait: normal       Tenderness Right paramedian tenderness of the Lower C-Spine and Lower L-Spine.Left paramedian tenderness of the Upper C-Spine and Lower L-Spine.      Comments:  15 out of 18 tender points    Lymphadenopathy:     She has no  cervical adenopathy.   Neurological: She is alert and oriented to person, place, and time.   Reflex Scores:       Patellar reflexes are 3+ on the right side and 3+ on the left side.  Skin: No rash noted. No erythema. No pallor.     Psychiatric: Mood and affect normal.   Musculoskeletal: She exhibits edema, tenderness and deformity.   ra changes on hands pips and B wrist are fusing          hla b 27 pos,  Drea 1;320    Assessment:         Encounter Diagnoses   Name Primary?    Rheumatoid arthritis, involving unspecified site, unspecified rheumatoid factor presence Yes    Ankylosing spondylitis, unspecified site of spine     Psoriatic arthritis          Plan:   Rheumatoid arthritis, involving unspecified site, unspecified rheumatoid factor presence  -     promethazine (PHENERGAN) 25 MG tablet; Take 1 tablet (25 mg total) by mouth every 4 (four) hours as needed.  Dispense: 30 tablet; Refill: 3  -     dexamethasone (DECADRON) 0.5 MG tablet; Take 1 tablet (0.5 mg total) by mouth once daily.  Dispense: 30 tablet; Refill: 1  -     Insulin-like growth factor; Future; Expected date: 07/31/2017    Ankylosing spondylitis, unspecified site of spine  -     promethazine (PHENERGAN) 25 MG tablet; Take 1 tablet (25 mg total) by mouth every 4 (four) hours as needed.  Dispense: 30 tablet; Refill: 3  -     dexamethasone (DECADRON) 0.5 MG tablet; Take 1 tablet (0.5 mg total) by mouth once daily.  Dispense: 30 tablet; Refill: 1  -     Insulin-like growth factor; Future; Expected date: 07/31/2017    Psoriatic arthritis  -     promethazine (PHENERGAN) 25 MG tablet; Take 1 tablet (25 mg total) by mouth every 4 (four) hours as needed.  Dispense: 30 tablet; Refill: 3  -     dexamethasone (DECADRON) 0.5 MG tablet; Take 1 tablet (0.5 mg total) by mouth once daily.  Dispense: 30 tablet; Refill: 1  -     Insulin-like growth factor; Future; Expected date: 07/31/2017    Anxiety disorder due to general medical  condition  Comments:  arthritis    Other orders  -     buPROPion (WELLBUTRIN XL) 150 MG TB24 tablet; Take 1 tablet (150 mg total) by mouth once daily.  Dispense: 30 tablet; Refill: 11  -     vortioxetine (TRINTELLIX) 5 mg Tab; Take 5 mg by mouth once daily.  Dispense: 30 tablet; Refill: 6     at this point, she requires  A transport vechile due to the severity of her arthritis  Starting Kevzara asap   Due to the severity of her disease state and the quality of her live as well as the inability to control the symptoms of her disease state I recommend continuing  long term disability

## 2017-08-11 ENCOUNTER — PATIENT MESSAGE (OUTPATIENT)
Dept: RHEUMATOLOGY | Facility: CLINIC | Age: 42
End: 2017-08-11

## 2017-08-26 RX ORDER — ONDANSETRON 4 MG/1
TABLET, ORALLY DISINTEGRATING ORAL
Qty: 45 TABLET | Refills: 4 | Status: SHIPPED | OUTPATIENT
Start: 2017-08-26 | End: 2019-07-03 | Stop reason: SDUPTHER

## 2017-11-03 ENCOUNTER — PATIENT MESSAGE (OUTPATIENT)
Dept: RHEUMATOLOGY | Facility: CLINIC | Age: 42
End: 2017-11-03

## 2017-11-06 ENCOUNTER — OFFICE VISIT (OUTPATIENT)
Dept: RHEUMATOLOGY | Facility: CLINIC | Age: 42
End: 2017-11-06
Payer: COMMERCIAL

## 2017-11-06 ENCOUNTER — TELEPHONE (OUTPATIENT)
Dept: PHARMACY | Facility: CLINIC | Age: 42
End: 2017-11-06

## 2017-11-06 VITALS
WEIGHT: 123 LBS | DIASTOLIC BLOOD PRESSURE: 91 MMHG | HEIGHT: 60 IN | HEART RATE: 98 BPM | BODY MASS INDEX: 24.15 KG/M2 | SYSTOLIC BLOOD PRESSURE: 146 MMHG

## 2017-11-06 DIAGNOSIS — M32.9 SYSTEMIC LUPUS ERYTHEMATOSUS, UNSPECIFIED SLE TYPE, UNSPECIFIED ORGAN INVOLVEMENT STATUS: ICD-10-CM

## 2017-11-06 DIAGNOSIS — R77.8 ABNORMAL SPEP: ICD-10-CM

## 2017-11-06 DIAGNOSIS — M45.9 ANKYLOSING SPONDYLITIS, UNSPECIFIED SITE OF SPINE: ICD-10-CM

## 2017-11-06 DIAGNOSIS — I73.00 RAYNAUD'S PHENOMENON WITHOUT GANGRENE: ICD-10-CM

## 2017-11-06 DIAGNOSIS — R10.9 ABDOMINAL PAIN, UNSPECIFIED ABDOMINAL LOCATION: ICD-10-CM

## 2017-11-06 DIAGNOSIS — M06.9 RHEUMATOID ARTHRITIS, INVOLVING UNSPECIFIED SITE, UNSPECIFIED RHEUMATOID FACTOR PRESENCE: Primary | ICD-10-CM

## 2017-11-06 PROCEDURE — 99999 PR PBB SHADOW E&M-EST. PATIENT-LVL V: CPT | Mod: PBBFAC,,, | Performed by: INTERNAL MEDICINE

## 2017-11-06 PROCEDURE — 99215 OFFICE O/P EST HI 40 MIN: CPT | Mod: S$GLB,,, | Performed by: INTERNAL MEDICINE

## 2017-11-06 NOTE — PROGRESS NOTES
Subjective:       Patient ID: Holli Kurtz is a 41 y.o. female.    Chief Complaint: Follow-up     Follow up: Ra, psa and AS and plac psoriasis  Jaw shifted, wrist fused,  Severely aggressive, she has noticed teeth shifting and bone growth. She has severe difficulty transferring from wheelchair to wheelchair with falls.  She is doing poorly. Patient complains of arthralgias and myalgias for which has been present for a few years. Pain is located in multiple joints, both shoulder(s), both elbow(s), both wrist(s), both MCP(s): 1st, 2nd, 3rd, 4th and 5th, both PIP(s): 1st, 2nd, 3rd, 4th and 5th, both DIP(s): 1st and 2nd, both hip(s), both knee(s) and both MTP(s): 1st, 2nd, 3rd, 4th and 5th, is described as aching, pulsating, shooting and throbbing, and is constant, moderate .  Associated symptoms include: crepitation, decreased range of motion, edema, effusion, tenderness and warmth.                She complains of joint swelling. Associated symptoms include fatigue and myalgias.     Treatments tried: enbrel, humira, remicaide,      Review of Systems   Constitutional: Positive for activity change and fatigue. Negative for appetite change, chills, diaphoresis and unexpected weight change.   HENT: Negative for congestion, dental problem, ear discharge, ear pain, facial swelling, mouth sores, nosebleeds, postnasal drip, rhinorrhea, sinus pressure, sneezing, sore throat, tinnitus and voice change.    Eyes: Negative for photophobia, pain, discharge, redness and itching.   Respiratory: Negative for apnea, cough, chest tightness, shortness of breath and wheezing.    Cardiovascular: Positive for leg swelling. Negative for chest pain and palpitations.   Gastrointestinal: Negative for abdominal distention, abdominal pain, constipation, diarrhea, nausea and vomiting.   Endocrine: Negative for cold intolerance, heat intolerance, polydipsia and polyuria.   Genitourinary: Negative for decreased urine volume, difficulty  urinating, flank pain, frequency, hematuria and urgency.   Musculoskeletal: Positive for arthralgias, back pain, gait problem, joint swelling, myalgias and neck pain. Negative for neck stiffness.   Skin: Negative for pallor, rash and wound.   Allergic/Immunologic: Negative for immunocompromised state.   Neurological: Negative for dizziness, tremors, weakness and numbness.   Hematological: Negative for adenopathy. Does not bruise/bleed easily.   Psychiatric/Behavioral: Negative for sleep disturbance. The patient is not nervous/anxious.          Objective:     BP (!) 146/91   Pulse 98   Ht 5' (1.524 m)   Wt 55.8 kg (123 lb)   BMI 24.02 kg/m²      Physical Exam   Nursing note and vitals reviewed.  Constitutional: She is oriented to person, place, and time. She appears distressed.   HENT:   Head: Normocephalic and atraumatic.   Right Ear: External ear normal.   Mouth/Throat: Oropharynx is clear and moist.   Eyes: EOM are normal. Pupils are equal, round, and reactive to light.   Neck: Neck supple. No thyromegaly present.   Cardiovascular: Normal rate, regular rhythm and normal heart sounds.  Exam reveals no gallop and no friction rub.    No murmur heard.  Pulmonary/Chest: Breath sounds normal. She has no wheezes. She has no rales. She exhibits no tenderness.   Abdominal: There is no tenderness. There is no rebound and no guarding.       Right Side Rheumatological Exam     Examination finds the elbow, 1st MCP, 2nd MCP, 3rd MCP, 4th MCP and 5th MCP normal.    The patient is tender to palpation of the shoulder, elbow, wrist, knee, 1st PIP, 1st MCP, 2nd PIP, 2nd MCP, 3rd PIP, 3rd MCP, 4th PIP, 4th MCP, 5th PIP and 5th MCP    She has swelling of the knee, 1st PIP, 1st MCP, 2nd PIP, 2nd MCP, 3rd PIP, 3rd MCP, 4th PIP, 4th MCP, 5th PIP and 5th MCP    The patient has an enlarged wrist, 1st PIP, 2nd PIP, 3rd PIP, 4th PIP and 5th PIP    Shoulder Exam   Tenderness Location: acromion    Range of Motion   Active Abduction:  abnormal   Adduction: abnormal  Sensation: normal    Knee Exam   Tenderness Location: medial joint line  Patellofemoral Crepitus: positive  Effusion: positive  Sensation: normal    Hip Exam   Tenderness Location: no tenderness  Sensation: normal    Elbow/Wrist Exam   Tenderness Location: no tenderness  Sensation: normal    Foot Exam   Right foot exam exhibits signs of inflamed dorsum  Right foot exam exhibits signs of no podagra, no tophus and no plantar fasciitis    Muscle Strength (0-5 scale):  : 4/5     Left Side Rheumatological Exam     Examination finds the elbow, knee, 1st MCP, 2nd MCP, 3rd MCP, 4th MCP and 5th MCP normal.    The patient is tender to palpation of the shoulder, elbow, wrist, knee, 1st PIP, 1st MCP, 2nd PIP, 2nd MCP, 3rd PIP, 3rd MCP, 4th PIP, 4th MCP, 5th PIP and 5th MCP.    She has swelling of the 1st PIP, 1st MCP, 2nd PIP, 2nd MCP, 3rd PIP, 3rd MCP, 4th PIP, 4th MCP, 5th PIP and 5th MCP    The patient has an enlarged wrist, 1st PIP, 2nd PIP, 3rd PIP, 4th PIP and 5th PIP.    Shoulder Exam   Tenderness Location: acromion    Range of Motion   Active Abduction: abnormal   Sensation: normal    Knee Exam   Tenderness Location: lateral joint line and medial joint line    Patellofemoral Crepitus: positive  Effusion: positive  Sensation: normal    Hip Exam   Tenderness Location: no tenderness  Sensation: normal    Elbow/Wrist Exam   Tenderness Location: lateral epicondyle and medial epicondyle  Sensation: normal    Foot Exam   Left foot exam exhibits signs of inflamed dorsum  Left foot exam exhibits signs of no podagra and no plantar fasciitis    Muscle Strength (0-5 scale):  :  4/5       Back/Neck Exam   General Inspection   Gait: normal       Tenderness Right paramedian tenderness of the Lower C-Spine and Lower L-Spine.Left paramedian tenderness of the Upper C-Spine and Lower L-Spine.      Comments:  15 out of 18 tender points    Lymphadenopathy:     She has no cervical adenopathy.    Neurological: She is alert and oriented to person, place, and time.   Reflex Scores:       Patellar reflexes are 3+ on the right side and 3+ on the left side.  Skin: No rash noted. No erythema. No pallor.     Psychiatric: Mood and affect normal.   Musculoskeletal: She exhibits edema, tenderness and deformity.   ra changes on hands pips and B wrist are fusing          hla b 27 pos,  Ankit 1;320.this  Results for orders placed or performed in visit on 07/10/17   CBC auto differential   Result Value Ref Range    WBC 6.41 3.90 - 12.70 K/uL    RBC 3.67 (L) 4.00 - 5.40 M/uL    Hemoglobin 10.2 (L) 12.0 - 16.0 g/dL    Hematocrit 31.8 (L) 37.0 - 48.5 %    MCV 87 82 - 98 fL    MCH 27.8 27.0 - 31.0 pg    MCHC 32.1 32.0 - 36.0 %    RDW 14.7 (H) 11.5 - 14.5 %    Platelets 326 150 - 350 K/uL    MPV 10.5 9.2 - 12.9 fL    Gran # 4.6 1.8 - 7.7 K/uL    Lymph # 1.4 1.0 - 4.8 K/uL    Mono # 0.3 0.3 - 1.0 K/uL    Eos # 0.1 0.0 - 0.5 K/uL    Baso # 0.01 0.00 - 0.20 K/uL    nRBC 0 0 /100 WBC    Gran% 71.6 38.0 - 73.0 %    Lymph% 22.3 18.0 - 48.0 %    Mono% 4.5 4.0 - 15.0 %    Eosinophil% 1.4 0.0 - 8.0 %    Basophil% 0.2 0.0 - 1.9 %    Differential Method Automated      Assessment:         Encounter Diagnoses   Name Primary?    Rheumatoid arthritis, involving unspecified site, unspecified rheumatoid factor presence Yes    Ankylosing spondylitis, unspecified site of spine     Raynaud's phenomenon without gangrene     Systemic lupus erythematosus, unspecified SLE type, unspecified organ involvement status     Abnormal SPEP          Plan:   Rheumatoid arthritis, involving unspecified site, unspecified rheumatoid factor presence  -     NM Bone Scan Whole Body; Future; Expected date: 11/06/2017  -     ANKIT; Future; Expected date: 11/06/2017  -     Anti Sm/RNP Antibody; Future; Expected date: 11/06/2017  -     Anti-DNA antibody, double-stranded; Future; Expected date: 11/06/2017  -     Anti-Histone Antibody; Future; Expected date:  11/06/2017  -     Anti-scleroderma antibody; Future; Expected date: 11/06/2017  -     Anti-Smith antibody; Future; Expected date: 11/06/2017  -     Sjogrens syndrome-A extractable nuclear antibody; Future; Expected date: 11/06/2017  -     Sjogrens syndrome-B extractable nuclear antibody; Future; Expected date: 11/06/2017  -     Sedimentation rate, manual; Future; Expected date: 11/06/2017  -     C-reactive protein; Future; Expected date: 11/06/2017  -     Protein electrophoresis, serum; Future; Expected date: 11/06/2017  -     Protein electrophoresis, urine; Future  -     C3 complement; Future; Expected date: 11/06/2017  -     C4 complement; Future; Expected date: 11/06/2017  -     Complement, total; Future; Expected date: 11/06/2017    Ankylosing spondylitis, unspecified site of spine  -     NM Bone Scan Whole Body; Future; Expected date: 11/06/2017  -     ANKIT; Future; Expected date: 11/06/2017  -     Anti Sm/RNP Antibody; Future; Expected date: 11/06/2017  -     Anti-DNA antibody, double-stranded; Future; Expected date: 11/06/2017  -     Anti-Histone Antibody; Future; Expected date: 11/06/2017  -     Anti-scleroderma antibody; Future; Expected date: 11/06/2017  -     Anti-Smith antibody; Future; Expected date: 11/06/2017  -     Sjogrens syndrome-A extractable nuclear antibody; Future; Expected date: 11/06/2017  -     Sjogrens syndrome-B extractable nuclear antibody; Future; Expected date: 11/06/2017  -     Sedimentation rate, manual; Future; Expected date: 11/06/2017  -     C-reactive protein; Future; Expected date: 11/06/2017  -     Protein electrophoresis, serum; Future; Expected date: 11/06/2017  -     Protein electrophoresis, urine; Future  -     C3 complement; Future; Expected date: 11/06/2017  -     C4 complement; Future; Expected date: 11/06/2017  -     Complement, total; Future; Expected date: 11/06/2017    Raynaud's phenomenon without gangrene  -     NM Bone Scan Whole Body; Future; Expected date:  11/06/2017  -     ANKIT; Future; Expected date: 11/06/2017  -     Anti Sm/RNP Antibody; Future; Expected date: 11/06/2017  -     Anti-DNA antibody, double-stranded; Future; Expected date: 11/06/2017  -     Anti-Histone Antibody; Future; Expected date: 11/06/2017  -     Anti-scleroderma antibody; Future; Expected date: 11/06/2017  -     Anti-Smith antibody; Future; Expected date: 11/06/2017  -     Sjogrens syndrome-A extractable nuclear antibody; Future; Expected date: 11/06/2017  -     Sjogrens syndrome-B extractable nuclear antibody; Future; Expected date: 11/06/2017  -     Sedimentation rate, manual; Future; Expected date: 11/06/2017  -     C-reactive protein; Future; Expected date: 11/06/2017  -     Protein electrophoresis, serum; Future; Expected date: 11/06/2017  -     Protein electrophoresis, urine; Future  -     C3 complement; Future; Expected date: 11/06/2017  -     C4 complement; Future; Expected date: 11/06/2017  -     Complement, total; Future; Expected date: 11/06/2017    Systemic lupus erythematosus, unspecified SLE type, unspecified organ involvement status  -     NM Bone Scan Whole Body; Future; Expected date: 11/06/2017  -     ANKIT; Future; Expected date: 11/06/2017  -     Anti Sm/RNP Antibody; Future; Expected date: 11/06/2017  -     Anti-DNA antibody, double-stranded; Future; Expected date: 11/06/2017  -     Anti-Histone Antibody; Future; Expected date: 11/06/2017  -     Anti-scleroderma antibody; Future; Expected date: 11/06/2017  -     Anti-Smith antibody; Future; Expected date: 11/06/2017  -     Sjogrens syndrome-A extractable nuclear antibody; Future; Expected date: 11/06/2017  -     Sjogrens syndrome-B extractable nuclear antibody; Future; Expected date: 11/06/2017  -     Sedimentation rate, manual; Future; Expected date: 11/06/2017  -     C-reactive protein; Future; Expected date: 11/06/2017  -     Protein electrophoresis, serum; Future; Expected date: 11/06/2017  -     Protein electrophoresis,  urine; Future  -     C3 complement; Future; Expected date: 11/06/2017  -     C4 complement; Future; Expected date: 11/06/2017  -     Complement, total; Future; Expected date: 11/06/2017    Abnormal SPEP  -     NM Bone Scan Whole Body; Future; Expected date: 11/06/2017  -     ANKIT; Future; Expected date: 11/06/2017  -     Anti Sm/RNP Antibody; Future; Expected date: 11/06/2017  -     Anti-DNA antibody, double-stranded; Future; Expected date: 11/06/2017  -     Anti-Histone Antibody; Future; Expected date: 11/06/2017  -     Anti-scleroderma antibody; Future; Expected date: 11/06/2017  -     Anti-Smith antibody; Future; Expected date: 11/06/2017  -     Sjogrens syndrome-A extractable nuclear antibody; Future; Expected date: 11/06/2017  -     Sjogrens syndrome-B extractable nuclear antibody; Future; Expected date: 11/06/2017  -     Sedimentation rate, manual; Future; Expected date: 11/06/2017  -     C-reactive protein; Future; Expected date: 11/06/2017  -     Protein electrophoresis, serum; Future; Expected date: 11/06/2017  -     Protein electrophoresis, urine; Future  -     C3 complement; Future; Expected date: 11/06/2017  -     C4 complement; Future; Expected date: 11/06/2017  -     Complement, total; Future; Expected date: 11/06/2017    Other orders  -     abatacept (ORENCIA CLICKJECT) 125 mg/mL AtIn; Inject 125 mg into the skin every 7 days.  Dispense: 4 Syringe; Refill: 11    we will try benlysta if orencia does not work

## 2017-11-06 NOTE — TELEPHONE ENCOUNTER
Informed patient [facility] received a prescription for Orencia and we will contact their insurance company to find out if the medication is covered. We will update patient of status as more information is received. feel free to give us a call with  any questions at [phone].

## 2017-11-08 ENCOUNTER — PATIENT MESSAGE (OUTPATIENT)
Dept: RHEUMATOLOGY | Facility: CLINIC | Age: 42
End: 2017-11-08

## 2017-11-08 DIAGNOSIS — M06.9 RHEUMATOID ARTHRITIS, INVOLVING UNSPECIFIED SITE, UNSPECIFIED RHEUMATOID FACTOR PRESENCE: ICD-10-CM

## 2017-11-09 NOTE — TELEPHONE ENCOUNTER
----- Message from Vanessa Multani sent at 11/9/2017  7:58 AM CST -----  Contact: jackeline gonzales,, [facility]   Needs order for injection weekly skilled nurse every Monday   Orencia, 125 sub q weekly   Fax   Call back   Along with lab draw orders

## 2017-11-24 ENCOUNTER — PATIENT MESSAGE (OUTPATIENT)
Dept: RHEUMATOLOGY | Facility: CLINIC | Age: 42
End: 2017-11-24

## 2017-11-28 ENCOUNTER — TELEPHONE (OUTPATIENT)
Dept: RHEUMATOLOGY | Facility: CLINIC | Age: 42
End: 2017-11-28

## 2017-11-28 RX ORDER — ACETAMINOPHEN 325 MG/1
650 TABLET ORAL
Status: CANCELLED | OUTPATIENT
Start: 2017-11-28

## 2017-11-28 RX ORDER — HEPARIN 100 UNIT/ML
500 SYRINGE INTRAVENOUS
Status: CANCELLED | OUTPATIENT
Start: 2017-11-28

## 2017-11-28 RX ORDER — SODIUM CHLORIDE 0.9 % (FLUSH) 0.9 %
10 SYRINGE (ML) INJECTION
Status: CANCELLED | OUTPATIENT
Start: 2017-11-28

## 2017-11-29 NOTE — TELEPHONE ENCOUNTER
ROBERT Finley LPN Cc: Ximena Mcgee; Chan Bradley; [facility]   Caller: Unspecified (Yesterday,  8:17 AM)             Pending chayo check with pre auth

## 2017-12-04 ENCOUNTER — TELEPHONE (OUTPATIENT)
Dept: RHEUMATOLOGY | Facility: CLINIC | Age: 42
End: 2017-12-04

## 2017-12-04 NOTE — TELEPHONE ENCOUNTER
Lm returning pt's call, advised orencia infusion is still pending approval. Please call back with any questions.

## 2017-12-04 NOTE — TELEPHONE ENCOUNTER
----- Message from Damaris Verma sent at 11/29/2017  9:33 AM CST -----  Please call patient in regards to patient having her port put in & is ready to schedule infusion's, [phone]

## 2017-12-14 ENCOUNTER — TELEPHONE (OUTPATIENT)
Dept: RHEUMATOLOGY | Facility: CLINIC | Age: 42
End: 2017-12-14

## 2017-12-14 ENCOUNTER — INFUSION (OUTPATIENT)
Dept: INFUSION THERAPY | Facility: HOSPITAL | Age: 42
End: 2017-12-14
Attending: INTERNAL MEDICINE
Payer: COMMERCIAL

## 2017-12-14 VITALS
RESPIRATION RATE: 18 BRPM | TEMPERATURE: 99 F | HEIGHT: 60 IN | WEIGHT: 123 LBS | HEART RATE: 105 BPM | BODY MASS INDEX: 24.15 KG/M2 | SYSTOLIC BLOOD PRESSURE: 146 MMHG | OXYGEN SATURATION: 97 % | DIASTOLIC BLOOD PRESSURE: 90 MMHG

## 2017-12-14 DIAGNOSIS — Z79.899 LONG TERM CURRENT USE OF IMMUNOSUPPRESSIVE DRUG: ICD-10-CM

## 2017-12-14 DIAGNOSIS — Z79.899 LONG TERM CURRENT USE OF IMMUNOSUPPRESSIVE DRUG: Primary | ICD-10-CM

## 2017-12-14 DIAGNOSIS — M06.9 RHEUMATOID ARTHRITIS OF HAND, UNSPECIFIED LATERALITY, UNSPECIFIED RHEUMATOID FACTOR PRESENCE: Primary | ICD-10-CM

## 2017-12-14 LAB
ALBUMIN SERPL BCP-MCNC: 4 G/DL
ALP SERPL-CCNC: 186 U/L
ALT SERPL W/O P-5'-P-CCNC: 45 U/L
ANION GAP SERPL CALC-SCNC: 11 MMOL/L
AST SERPL-CCNC: 38 U/L
BASOPHILS # BLD AUTO: 0.02 K/UL
BASOPHILS NFR BLD: 0.2 %
BILIRUB SERPL-MCNC: 0.9 MG/DL
BUN SERPL-MCNC: 12 MG/DL
CALCIUM SERPL-MCNC: 9.8 MG/DL
CHLORIDE SERPL-SCNC: 99 MMOL/L
CO2 SERPL-SCNC: 31 MMOL/L
CREAT SERPL-MCNC: 0.42 MG/DL
DIFFERENTIAL METHOD: ABNORMAL
EOSINOPHIL # BLD AUTO: 0.1 K/UL
EOSINOPHIL NFR BLD: 0.8 %
ERYTHROCYTE [DISTWIDTH] IN BLOOD BY AUTOMATED COUNT: 11.4 %
EST. GFR  (AFRICAN AMERICAN): >60 ML/MIN/1.73 M^2
EST. GFR  (NON AFRICAN AMERICAN): >60 ML/MIN/1.73 M^2
GLUCOSE SERPL-MCNC: 91 MG/DL
HCT VFR BLD AUTO: 31.3 %
HGB BLD-MCNC: 9.8 G/DL
LYMPHOCYTES # BLD AUTO: 2.1 K/UL
LYMPHOCYTES NFR BLD: 21.1 %
MCH RBC QN AUTO: 29 PG
MCHC RBC AUTO-ENTMCNC: 31.3 G/DL
MCV RBC AUTO: 93 FL
MONOCYTES # BLD AUTO: 0.6 K/UL
MONOCYTES NFR BLD: 6.3 %
NEUTROPHILS # BLD AUTO: 7.1 K/UL
NEUTROPHILS NFR BLD: 71.6 %
NRBC BLD-RTO: 0 /100 WBC
PLATELET # BLD AUTO: 422 K/UL
PMV BLD AUTO: 9.7 FL
POTASSIUM SERPL-SCNC: 3.7 MMOL/L
PROT SERPL-MCNC: 8.6 G/DL
RBC # BLD AUTO: 3.38 M/UL
SODIUM SERPL-SCNC: 141 MMOL/L
WBC # BLD AUTO: 9.98 K/UL

## 2017-12-14 PROCEDURE — 96365 THER/PROPH/DIAG IV INF INIT: CPT | Mod: PN

## 2017-12-14 PROCEDURE — 80053 COMPREHEN METABOLIC PANEL: CPT

## 2017-12-14 PROCEDURE — 85025 COMPLETE CBC W/AUTO DIFF WBC: CPT

## 2017-12-14 PROCEDURE — 80053 COMPREHEN METABOLIC PANEL: CPT | Mod: PN

## 2017-12-14 PROCEDURE — 86480 TB TEST CELL IMMUN MEASURE: CPT

## 2017-12-14 PROCEDURE — 96375 TX/PRO/DX INJ NEW DRUG ADDON: CPT | Mod: PN

## 2017-12-14 PROCEDURE — A4216 STERILE WATER/SALINE, 10 ML: HCPCS | Mod: PN | Performed by: INTERNAL MEDICINE

## 2017-12-14 PROCEDURE — 85025 COMPLETE CBC W/AUTO DIFF WBC: CPT | Mod: PN

## 2017-12-14 PROCEDURE — 63600175 PHARM REV CODE 636 W HCPCS: Mod: PN | Performed by: INTERNAL MEDICINE

## 2017-12-14 PROCEDURE — 25000003 PHARM REV CODE 250: Mod: PN | Performed by: INTERNAL MEDICINE

## 2017-12-14 RX ORDER — HEPARIN 100 UNIT/ML
500 SYRINGE INTRAVENOUS
Status: CANCELLED | OUTPATIENT
Start: 2017-12-14

## 2017-12-14 RX ORDER — ACETAMINOPHEN 325 MG/1
650 TABLET ORAL
Status: CANCELLED | OUTPATIENT
Start: 2017-12-14

## 2017-12-14 RX ORDER — SODIUM CHLORIDE 0.9 % (FLUSH) 0.9 %
10 SYRINGE (ML) INJECTION
Status: CANCELLED | OUTPATIENT
Start: 2017-12-14

## 2017-12-14 RX ORDER — ONDANSETRON 2 MG/ML
8 INJECTION INTRAMUSCULAR; INTRAVENOUS ONCE AS NEEDED
Status: CANCELLED
Start: 2017-12-14 | End: 2017-12-14

## 2017-12-14 RX ORDER — HEPARIN 100 UNIT/ML
500 SYRINGE INTRAVENOUS
Status: DISCONTINUED | OUTPATIENT
Start: 2017-12-14 | End: 2017-12-14 | Stop reason: HOSPADM

## 2017-12-14 RX ORDER — ACETAMINOPHEN 325 MG/1
650 TABLET ORAL
Status: COMPLETED | OUTPATIENT
Start: 2017-12-14 | End: 2017-12-14

## 2017-12-14 RX ORDER — SODIUM CHLORIDE 0.9 % (FLUSH) 0.9 %
10 SYRINGE (ML) INJECTION
Status: DISCONTINUED | OUTPATIENT
Start: 2017-12-14 | End: 2017-12-14 | Stop reason: HOSPADM

## 2017-12-14 RX ORDER — HEPARIN SODIUM (PORCINE) LOCK FLUSH IV SOLN 100 UNIT/ML 100 UNIT/ML
100 SOLUTION INTRAVENOUS
Status: CANCELLED | OUTPATIENT
Start: 2017-12-14

## 2017-12-14 RX ORDER — DIPHENHYDRAMINE HYDROCHLORIDE 50 MG/ML
25 INJECTION INTRAMUSCULAR; INTRAVENOUS
Status: COMPLETED | OUTPATIENT
Start: 2017-12-14 | End: 2017-12-14

## 2017-12-14 RX ADMIN — ACETAMINOPHEN 650 MG: 325 TABLET ORAL at 01:12

## 2017-12-14 RX ADMIN — SODIUM CHLORIDE: 900 INJECTION, SOLUTION INTRAVENOUS at 01:12

## 2017-12-14 RX ADMIN — SODIUM CHLORIDE 750 MG: 9 INJECTION, SOLUTION INTRAVENOUS at 01:12

## 2017-12-14 RX ADMIN — HEPARIN 500 UNITS: 100 SYRINGE at 02:12

## 2017-12-14 RX ADMIN — DIPHENHYDRAMINE HYDROCHLORIDE 25 MG: 50 INJECTION INTRAMUSCULAR; INTRAVENOUS at 01:12

## 2017-12-14 RX ADMIN — SODIUM CHLORIDE, PRESERVATIVE FREE 10 ML: 5 INJECTION INTRAVENOUS at 02:12

## 2017-12-14 NOTE — PLAN OF CARE
Problem: Patient Care Overview  Goal: Plan of Care Review  Outcome: Ongoing (interventions implemented as appropriate)  Pt tolerated 1st Orencia infusion well.  No s/s of infusion reaction noted.  Pt instructed to call MD with any problems.

## 2017-12-19 LAB
MITOGEN NIL: 2.78 IU/ML
NIL: 0.03 IU/ML
TB ANTIGEN NIL: 0 IU/ML
TB ANTIGEN: 0.03 IU/ML
TB GOLD: NEGATIVE

## 2017-12-28 ENCOUNTER — TELEPHONE (OUTPATIENT)
Dept: INFUSION THERAPY | Facility: HOSPITAL | Age: 42
End: 2017-12-28

## 2017-12-28 NOTE — TELEPHONE ENCOUNTER
[12/28/2017 3:09 PM] Celeste Healykristen:   alonzo tejada mrn [phone] is here for orencia she has a temp 99.1 cough congestion do you know if it is okay to give the orencia?  [12/28/2017 3:36 PM] Clara Grier:   yes hold the infusion if she is ill.

## 2017-12-29 ENCOUNTER — TELEPHONE (OUTPATIENT)
Dept: RHEUMATOLOGY | Facility: CLINIC | Age: 42
End: 2017-12-29

## 2017-12-29 NOTE — TELEPHONE ENCOUNTER
Spoke with patient regarding symptoms. Stated running low grade fever, coughing up thick mucus and has a runny nose. Nurse informed Dr Montaño of symptoms received verbal order for zpac and diflucan. Informed patient that medication will be sent to pharmacy. Patient verbalized understanding.

## 2017-12-29 NOTE — TELEPHONE ENCOUNTER
----- Message from Viola Honeycutt sent at 12/28/2017  3:51 PM CST -----  Contact: self  Patient says Dr Montaño told her to speak to Yessica regarding getting an antibiotic called in. Please call back at [phone] (home)       [employer] [id number] [address] AT [facility] [address] & [address] [place], [place] [address] [address] [address]  Phone: [phone] Fax: [phone]

## 2018-01-04 ENCOUNTER — TELEPHONE (OUTPATIENT)
Dept: INFUSION THERAPY | Facility: HOSPITAL | Age: 43
End: 2018-01-04

## 2018-01-04 NOTE — TELEPHONE ENCOUNTER
----- Message from Laya Singh sent at 1/3/2018  1:09 PM CST -----  Patient needs to reschedule missed appointment/please call back at [phone].

## 2018-01-09 ENCOUNTER — INFUSION (OUTPATIENT)
Dept: INFUSION THERAPY | Facility: HOSPITAL | Age: 43
End: 2018-01-09
Attending: INTERNAL MEDICINE
Payer: COMMERCIAL

## 2018-01-09 ENCOUNTER — TELEPHONE (OUTPATIENT)
Dept: PHARMACY | Facility: AMBULARY SURGERY CENTER | Age: 43
End: 2018-01-09

## 2018-01-09 VITALS
WEIGHT: 115.81 LBS | RESPIRATION RATE: 18 BRPM | HEART RATE: 101 BPM | OXYGEN SATURATION: 98 % | HEIGHT: 60 IN | SYSTOLIC BLOOD PRESSURE: 129 MMHG | BODY MASS INDEX: 22.74 KG/M2 | DIASTOLIC BLOOD PRESSURE: 88 MMHG | TEMPERATURE: 99 F

## 2018-01-09 DIAGNOSIS — M06.9 RHEUMATOID ARTHRITIS OF HAND, UNSPECIFIED LATERALITY, UNSPECIFIED RHEUMATOID FACTOR PRESENCE: Primary | ICD-10-CM

## 2018-01-09 PROCEDURE — 96375 TX/PRO/DX INJ NEW DRUG ADDON: CPT | Mod: PN

## 2018-01-09 PROCEDURE — A4216 STERILE WATER/SALINE, 10 ML: HCPCS | Mod: PN | Performed by: INTERNAL MEDICINE

## 2018-01-09 PROCEDURE — 96365 THER/PROPH/DIAG IV INF INIT: CPT | Mod: PN

## 2018-01-09 PROCEDURE — 63600175 PHARM REV CODE 636 W HCPCS: Mod: TB,PN | Performed by: INTERNAL MEDICINE

## 2018-01-09 PROCEDURE — 25000003 PHARM REV CODE 250: Mod: PN | Performed by: INTERNAL MEDICINE

## 2018-01-09 RX ORDER — HEPARIN 100 UNIT/ML
500 SYRINGE INTRAVENOUS
Status: CANCELLED | OUTPATIENT
Start: 2018-01-09

## 2018-01-09 RX ORDER — ONDANSETRON 2 MG/ML
8 INJECTION INTRAMUSCULAR; INTRAVENOUS ONCE AS NEEDED
Status: CANCELLED
Start: 2018-01-09 | End: 2018-01-09

## 2018-01-09 RX ORDER — ACETAMINOPHEN 325 MG/1
650 TABLET ORAL
Status: CANCELLED | OUTPATIENT
Start: 2018-01-09

## 2018-01-09 RX ORDER — ACETAMINOPHEN 325 MG/1
650 TABLET ORAL
Status: DISCONTINUED | OUTPATIENT
Start: 2018-01-09 | End: 2018-01-09 | Stop reason: SDUPTHER

## 2018-01-09 RX ORDER — DIPHENHYDRAMINE HYDROCHLORIDE 50 MG/ML
25 INJECTION INTRAMUSCULAR; INTRAVENOUS
Status: COMPLETED | OUTPATIENT
Start: 2018-01-09 | End: 2018-01-09

## 2018-01-09 RX ORDER — HEPARIN 100 UNIT/ML
500 SYRINGE INTRAVENOUS
Status: DISCONTINUED | OUTPATIENT
Start: 2018-01-09 | End: 2018-01-09 | Stop reason: HOSPADM

## 2018-01-09 RX ORDER — HEPARIN SODIUM (PORCINE) LOCK FLUSH IV SOLN 100 UNIT/ML 100 UNIT/ML
100 SOLUTION INTRAVENOUS
Status: CANCELLED | OUTPATIENT
Start: 2018-01-09

## 2018-01-09 RX ORDER — SODIUM CHLORIDE 0.9 % (FLUSH) 0.9 %
10 SYRINGE (ML) INJECTION
Status: CANCELLED | OUTPATIENT
Start: 2018-01-09

## 2018-01-09 RX ORDER — SODIUM CHLORIDE 0.9 % (FLUSH) 0.9 %
10 SYRINGE (ML) INJECTION
Status: DISCONTINUED | OUTPATIENT
Start: 2018-01-09 | End: 2018-01-09 | Stop reason: HOSPADM

## 2018-01-09 RX ORDER — ACETAMINOPHEN 325 MG/1
650 TABLET ORAL
Status: COMPLETED | OUTPATIENT
Start: 2018-01-09 | End: 2018-01-09

## 2018-01-09 RX ADMIN — SODIUM CHLORIDE, PRESERVATIVE FREE 10 ML: 5 INJECTION INTRAVENOUS at 02:01

## 2018-01-09 RX ADMIN — SODIUM CHLORIDE: 900 INJECTION, SOLUTION INTRAVENOUS at 02:01

## 2018-01-09 RX ADMIN — ACETAMINOPHEN 650 MG: 325 TABLET ORAL at 02:01

## 2018-01-09 RX ADMIN — SODIUM CHLORIDE 750 MG: 9 INJECTION, SOLUTION INTRAVENOUS at 02:01

## 2018-01-09 RX ADMIN — SODIUM CHLORIDE, PRESERVATIVE FREE 10 ML: 5 INJECTION INTRAVENOUS at 03:01

## 2018-01-09 RX ADMIN — DIPHENHYDRAMINE HYDROCHLORIDE 25 MG: 50 INJECTION INTRAMUSCULAR; INTRAVENOUS at 02:01

## 2018-01-09 NOTE — TELEPHONE ENCOUNTER
I verified dose with Dr Montaño.   As per Shavonne Miramontes, Dr Montaño wants to give Orencia 750mg.  She is aware of the patients documented wt of 52.5kg.

## 2018-01-10 NOTE — PLAN OF CARE
Problem: Patient Care Overview  Goal: Plan of Care Review  Outcome: Ongoing (interventions implemented as appropriate)  Tolerated Orencia infusion without any difficulty; RTC in 2 weeks for 3rd tx.  F/u with md as already scheduled.  D/c'd from unit via wc with mom.  PETER

## 2018-01-23 ENCOUNTER — INFUSION (OUTPATIENT)
Dept: INFUSION THERAPY | Facility: HOSPITAL | Age: 43
End: 2018-01-23
Attending: INTERNAL MEDICINE
Payer: COMMERCIAL

## 2018-01-23 VITALS
DIASTOLIC BLOOD PRESSURE: 79 MMHG | HEIGHT: 60 IN | OXYGEN SATURATION: 99 % | HEART RATE: 97 BPM | RESPIRATION RATE: 18 BRPM | SYSTOLIC BLOOD PRESSURE: 121 MMHG | TEMPERATURE: 99 F | BODY MASS INDEX: 21.75 KG/M2 | WEIGHT: 110.81 LBS

## 2018-01-23 DIAGNOSIS — M06.9 RHEUMATOID ARTHRITIS OF HAND, UNSPECIFIED LATERALITY, UNSPECIFIED RHEUMATOID FACTOR PRESENCE: Primary | ICD-10-CM

## 2018-01-23 PROCEDURE — A4216 STERILE WATER/SALINE, 10 ML: HCPCS | Mod: PN | Performed by: INTERNAL MEDICINE

## 2018-01-23 PROCEDURE — 96365 THER/PROPH/DIAG IV INF INIT: CPT | Mod: PN

## 2018-01-23 PROCEDURE — 25000003 PHARM REV CODE 250: Mod: PN | Performed by: INTERNAL MEDICINE

## 2018-01-23 PROCEDURE — 63600175 PHARM REV CODE 636 W HCPCS: Mod: TB,PN | Performed by: INTERNAL MEDICINE

## 2018-01-23 PROCEDURE — 96375 TX/PRO/DX INJ NEW DRUG ADDON: CPT | Mod: PN

## 2018-01-23 RX ORDER — HEPARIN 100 UNIT/ML
500 SYRINGE INTRAVENOUS
Status: CANCELLED | OUTPATIENT
Start: 2018-01-23

## 2018-01-23 RX ORDER — SODIUM CHLORIDE 0.9 % (FLUSH) 0.9 %
10 SYRINGE (ML) INJECTION
Status: DISCONTINUED | OUTPATIENT
Start: 2018-01-23 | End: 2018-01-23 | Stop reason: HOSPADM

## 2018-01-23 RX ORDER — FLUCONAZOLE 150 MG/1
150 TABLET ORAL DAILY
Qty: 3 TABLET | Refills: 0 | Status: SHIPPED | OUTPATIENT
Start: 2018-01-23 | End: 2018-01-26

## 2018-01-23 RX ORDER — SODIUM CHLORIDE 0.9 % (FLUSH) 0.9 %
10 SYRINGE (ML) INJECTION
Status: CANCELLED | OUTPATIENT
Start: 2018-01-23

## 2018-01-23 RX ORDER — ONDANSETRON 2 MG/ML
8 INJECTION INTRAMUSCULAR; INTRAVENOUS ONCE AS NEEDED
Status: CANCELLED
Start: 2018-01-23 | End: 2018-01-23

## 2018-01-23 RX ORDER — ACETAMINOPHEN 325 MG/1
650 TABLET ORAL
Status: COMPLETED | OUTPATIENT
Start: 2018-01-23 | End: 2018-01-23

## 2018-01-23 RX ORDER — DIPHENHYDRAMINE HYDROCHLORIDE 50 MG/ML
25 INJECTION INTRAMUSCULAR; INTRAVENOUS
Status: COMPLETED | OUTPATIENT
Start: 2018-01-23 | End: 2018-01-23

## 2018-01-23 RX ORDER — HEPARIN SODIUM (PORCINE) LOCK FLUSH IV SOLN 100 UNIT/ML 100 UNIT/ML
100 SOLUTION INTRAVENOUS
Status: CANCELLED | OUTPATIENT
Start: 2018-01-23

## 2018-01-23 RX ORDER — ACETAMINOPHEN 325 MG/1
650 TABLET ORAL
Status: CANCELLED | OUTPATIENT
Start: 2018-01-23

## 2018-01-23 RX ORDER — AZITHROMYCIN 250 MG/1
TABLET, FILM COATED ORAL
Qty: 6 TABLET | Refills: 0 | Status: SHIPPED | OUTPATIENT
Start: 2018-01-23 | End: 2018-03-01 | Stop reason: SDUPTHER

## 2018-01-23 RX ADMIN — DIPHENHYDRAMINE HYDROCHLORIDE 25 MG: 50 INJECTION INTRAMUSCULAR; INTRAVENOUS at 02:01

## 2018-01-23 RX ADMIN — ACETAMINOPHEN 650 MG: 325 TABLET ORAL at 02:01

## 2018-01-23 RX ADMIN — SODIUM CHLORIDE 750 MG: 9 INJECTION, SOLUTION INTRAVENOUS at 02:01

## 2018-01-23 RX ADMIN — SODIUM CHLORIDE: 900 INJECTION, SOLUTION INTRAVENOUS at 02:01

## 2018-01-23 RX ADMIN — SODIUM CHLORIDE, PRESERVATIVE FREE 10 ML: 5 INJECTION INTRAVENOUS at 03:01

## 2018-01-23 NOTE — PLAN OF CARE
Problem: Patient Care Overview  Goal: Plan of Care Review  Outcome: Outcome(s) achieved Date Met: 01/23/18  Pt arrived for Orencia infusion. PAC accessed X 1 attempt to LCW, + blood return noted/flushes easily. Pt tolerated treatment very well. PAC flushed & de accessed w/o difficulty. Pt D/C'd home with family & instructions via use of W/C

## 2018-01-25 ENCOUNTER — PATIENT MESSAGE (OUTPATIENT)
Dept: RHEUMATOLOGY | Facility: CLINIC | Age: 43
End: 2018-01-25

## 2018-02-20 ENCOUNTER — INFUSION (OUTPATIENT)
Dept: INFUSION THERAPY | Facility: HOSPITAL | Age: 43
End: 2018-02-20
Attending: INTERNAL MEDICINE
Payer: MEDICARE

## 2018-02-20 VITALS
TEMPERATURE: 99 F | RESPIRATION RATE: 18 BRPM | SYSTOLIC BLOOD PRESSURE: 139 MMHG | WEIGHT: 110.81 LBS | OXYGEN SATURATION: 98 % | HEART RATE: 96 BPM | HEIGHT: 60 IN | DIASTOLIC BLOOD PRESSURE: 85 MMHG | BODY MASS INDEX: 21.75 KG/M2

## 2018-02-20 DIAGNOSIS — M33.20 IDIOPATHIC POLYMYOSITIS: ICD-10-CM

## 2018-02-20 DIAGNOSIS — M06.9 RHEUMATOID ARTHRITIS OF HAND, UNSPECIFIED LATERALITY, UNSPECIFIED RHEUMATOID FACTOR PRESENCE: ICD-10-CM

## 2018-02-20 DIAGNOSIS — D50.9 IRON DEFICIENCY ANEMIA, UNSPECIFIED IRON DEFICIENCY ANEMIA TYPE: Primary | ICD-10-CM

## 2018-02-20 DIAGNOSIS — D83.9 CVID (COMMON VARIABLE IMMUNODEFICIENCY): ICD-10-CM

## 2018-02-20 PROCEDURE — 25000003 PHARM REV CODE 250: Mod: PN | Performed by: INTERNAL MEDICINE

## 2018-02-20 PROCEDURE — 96367 TX/PROPH/DG ADDL SEQ IV INF: CPT | Mod: PN

## 2018-02-20 PROCEDURE — 96365 THER/PROPH/DIAG IV INF INIT: CPT | Mod: PN

## 2018-02-20 PROCEDURE — A4216 STERILE WATER/SALINE, 10 ML: HCPCS | Mod: PN | Performed by: INTERNAL MEDICINE

## 2018-02-20 PROCEDURE — 63600175 PHARM REV CODE 636 W HCPCS: Mod: TB,PN | Performed by: INTERNAL MEDICINE

## 2018-02-20 RX ORDER — ACETAMINOPHEN 325 MG/1
650 TABLET ORAL
Status: CANCELLED | OUTPATIENT
Start: 2018-02-20

## 2018-02-20 RX ORDER — HEPARIN 100 UNIT/ML
500 SYRINGE INTRAVENOUS
Status: CANCELLED | OUTPATIENT
Start: 2018-02-20

## 2018-02-20 RX ORDER — SODIUM CHLORIDE 0.9 % (FLUSH) 0.9 %
10 SYRINGE (ML) INJECTION
Status: CANCELLED | OUTPATIENT
Start: 2018-02-20

## 2018-02-20 RX ORDER — DIPHENHYDRAMINE HYDROCHLORIDE 50 MG/ML
25 INJECTION INTRAMUSCULAR; INTRAVENOUS
Status: COMPLETED | OUTPATIENT
Start: 2018-02-20 | End: 2018-02-20

## 2018-02-20 RX ORDER — SODIUM CHLORIDE 0.9 % (FLUSH) 0.9 %
10 SYRINGE (ML) INJECTION
Status: DISCONTINUED | OUTPATIENT
Start: 2018-02-20 | End: 2018-02-20 | Stop reason: HOSPADM

## 2018-02-20 RX ORDER — HEPARIN SODIUM (PORCINE) LOCK FLUSH IV SOLN 100 UNIT/ML 100 UNIT/ML
100 SOLUTION INTRAVENOUS
Status: CANCELLED | OUTPATIENT
Start: 2018-02-20

## 2018-02-20 RX ORDER — ONDANSETRON 2 MG/ML
8 INJECTION INTRAMUSCULAR; INTRAVENOUS ONCE AS NEEDED
Status: CANCELLED
Start: 2018-02-20 | End: 2018-02-20

## 2018-02-20 RX ORDER — ACETAMINOPHEN 325 MG/1
650 TABLET ORAL
Status: COMPLETED | OUTPATIENT
Start: 2018-02-20 | End: 2018-02-20

## 2018-02-20 RX ADMIN — ACETAMINOPHEN 650 MG: 325 TABLET ORAL at 02:02

## 2018-02-20 RX ADMIN — DIPHENHYDRAMINE HYDROCHLORIDE 25 MG: 50 INJECTION, SOLUTION INTRAMUSCULAR; INTRAVENOUS at 02:02

## 2018-02-20 RX ADMIN — SODIUM CHLORIDE, PRESERVATIVE FREE 10 ML: 5 INJECTION INTRAVENOUS at 03:02

## 2018-02-20 RX ADMIN — SODIUM CHLORIDE: 9 INJECTION, SOLUTION INTRAVENOUS at 02:02

## 2018-02-20 RX ADMIN — SODIUM CHLORIDE: 9 INJECTION, SOLUTION INTRAVENOUS at 03:02

## 2018-02-20 RX ADMIN — SODIUM CHLORIDE 750 MG: 9 INJECTION, SOLUTION INTRAVENOUS at 02:02

## 2018-02-28 ENCOUNTER — PATIENT MESSAGE (OUTPATIENT)
Dept: RHEUMATOLOGY | Facility: CLINIC | Age: 43
End: 2018-02-28

## 2018-03-01 ENCOUNTER — TELEPHONE (OUTPATIENT)
Dept: RHEUMATOLOGY | Facility: CLINIC | Age: 43
End: 2018-03-01

## 2018-03-01 ENCOUNTER — OFFICE VISIT (OUTPATIENT)
Dept: RHEUMATOLOGY | Facility: CLINIC | Age: 43
End: 2018-03-01
Payer: MEDICARE

## 2018-03-01 VITALS
DIASTOLIC BLOOD PRESSURE: 87 MMHG | SYSTOLIC BLOOD PRESSURE: 140 MMHG | WEIGHT: 105 LBS | HEART RATE: 107 BPM | BODY MASS INDEX: 20.51 KG/M2

## 2018-03-01 DIAGNOSIS — R77.8 ABNORMAL SPEP: ICD-10-CM

## 2018-03-01 DIAGNOSIS — R29.898 SEVERE MUSCLE DECONDITIONING: ICD-10-CM

## 2018-03-01 DIAGNOSIS — M45.9 ANKYLOSING SPONDYLITIS, UNSPECIFIED SITE OF SPINE: ICD-10-CM

## 2018-03-01 DIAGNOSIS — D64.9 ANEMIA, UNSPECIFIED TYPE: ICD-10-CM

## 2018-03-01 DIAGNOSIS — M06.9 RHEUMATOID ARTHRITIS, INVOLVING UNSPECIFIED SITE, UNSPECIFIED RHEUMATOID FACTOR PRESENCE: Primary | ICD-10-CM

## 2018-03-01 DIAGNOSIS — M32.9 SYSTEMIC LUPUS ERYTHEMATOSUS, UNSPECIFIED SLE TYPE, UNSPECIFIED ORGAN INVOLVEMENT STATUS: ICD-10-CM

## 2018-03-01 DIAGNOSIS — L40.50 PSORIATIC ARTHRITIS: ICD-10-CM

## 2018-03-01 PROCEDURE — 99999 PR PBB SHADOW E&M-EST. PATIENT-LVL IV: CPT | Mod: PBBFAC,,, | Performed by: INTERNAL MEDICINE

## 2018-03-01 PROCEDURE — 99215 OFFICE O/P EST HI 40 MIN: CPT | Mod: S$PBB,,, | Performed by: INTERNAL MEDICINE

## 2018-03-01 PROCEDURE — 99214 OFFICE O/P EST MOD 30 MIN: CPT | Mod: PBBFAC,PO | Performed by: INTERNAL MEDICINE

## 2018-03-01 RX ORDER — ACETAMINOPHEN 325 MG/1
650 TABLET ORAL
Status: CANCELLED | OUTPATIENT
Start: 2018-03-01

## 2018-03-01 RX ORDER — SODIUM CHLORIDE 0.9 % (FLUSH) 0.9 %
10 SYRINGE (ML) INJECTION
Status: CANCELLED | OUTPATIENT
Start: 2018-03-01

## 2018-03-01 RX ORDER — AZITHROMYCIN 250 MG/1
TABLET, FILM COATED ORAL
Qty: 6 TABLET | Refills: 4 | Status: SHIPPED | OUTPATIENT
Start: 2018-03-01 | End: 2018-09-05

## 2018-03-01 RX ORDER — METHOCARBAMOL 750 MG/1
TABLET, FILM COATED ORAL
COMMUNITY
Start: 2017-11-30 | End: 2020-03-23

## 2018-03-01 RX ORDER — HEPARIN 100 UNIT/ML
500 SYRINGE INTRAVENOUS
Status: CANCELLED | OUTPATIENT
Start: 2018-03-01

## 2018-03-01 RX ORDER — GABAPENTIN 100 MG/1
100 CAPSULE ORAL
COMMUNITY
End: 2020-03-23

## 2018-03-01 RX ORDER — LIDOCAINE AND PRILOCAINE 25; 25 MG/G; MG/G
CREAM TOPICAL
COMMUNITY
Start: 2017-12-28 | End: 2018-12-28

## 2018-03-01 RX ORDER — FLUCONAZOLE 150 MG/1
TABLET ORAL
Qty: 15 TABLET | Refills: 3 | Status: SHIPPED | OUTPATIENT
Start: 2018-03-01 | End: 2019-02-04 | Stop reason: SDUPTHER

## 2018-03-01 ASSESSMENT — ROUTINE ASSESSMENT OF PATIENT INDEX DATA (RAPID3)
TOTAL RAPID3 SCORE: 6.55
AM STIFFNESS SCORE: 1, YES
WHEN YOU AWAKENED IN THE MORNING OVER THE LAST WEEK, PLEASE INDICATE THE AMOUNT OF TIME IT TAKES UNTIL YOU ARE AS LIMBER AS YOU WILL BE FOR THE DAY: ALL DAY
PSYCHOLOGICAL DISTRESS SCORE: 3.3
MDHAQ FUNCTION SCORE: 2
PATIENT GLOBAL ASSESSMENT SCORE: 3
FATIGUE SCORE: 5
PAIN SCORE: 10

## 2018-03-01 NOTE — PROGRESS NOTES
Subjective:       Patient ID: Holli Kurtz is a 42 y.o. female.    Chief Complaint: Follow-up     Follow up: Ra, psa and AS and plac psoriasis myosititis severe anemia,wasting away,she has noticed teeth shifting and bone growth. She has severe difficulty transferring from wheelchair to wheelchair with falls.  She is doing poorly. Patient complains of arthralgias and myalgias for which has been present for a few years. Pain is located in multiple joints, both shoulder(s), both elbow(s), both wrist(s), both MCP(s): 1st, 2nd, 3rd, 4th and 5th, both PIP(s): 1st, 2nd, 3rd, 4th and 5th, both DIP(s): 1st and 2nd, both hip(s), both knee(s) and both MTP(s): 1st, 2nd, 3rd, 4th and 5th, is described as aching, pulsating, shooting and throbbing, and is constant, moderate .        Review of Systems   Constitutional: Positive for activity change. Negative for appetite change, chills, diaphoresis and unexpected weight change.   HENT: Negative for congestion, dental problem, ear discharge, ear pain, facial swelling, mouth sores, nosebleeds, postnasal drip, rhinorrhea, sinus pressure, sneezing, sore throat, tinnitus and voice change.    Eyes: Negative for photophobia, pain, discharge, redness and itching.   Respiratory: Negative for apnea, cough, chest tightness, shortness of breath and wheezing.    Cardiovascular: Positive for leg swelling. Negative for chest pain and palpitations.   Gastrointestinal: Negative for abdominal distention, abdominal pain, constipation, diarrhea, nausea and vomiting.   Endocrine: Negative for cold intolerance, heat intolerance, polydipsia and polyuria.   Genitourinary: Negative for decreased urine volume, difficulty urinating, flank pain, frequency, hematuria and urgency.   Musculoskeletal: Positive for arthralgias, back pain, gait problem, joint swelling, myalgias, neck pain and neck stiffness.   Skin: Negative for pallor, rash and wound.   Allergic/Immunologic: Negative for  immunocompromised state.   Neurological: Negative for dizziness, tremors, weakness and numbness.   Hematological: Negative for adenopathy. Does not bruise/bleed easily.   Psychiatric/Behavioral: Negative for sleep disturbance. The patient is not nervous/anxious.          Objective:     BP (!) 140/87 (BP Location: Right arm, Patient Position: Sitting, BP Method: Medium (Automatic))   Pulse 107   Wt 47.6 kg (105 lb)   BMI 20.51 kg/m²      Physical Exam   Nursing note and vitals reviewed.  Constitutional: She is oriented to person, place, and time. She appears distressed.   HENT:   Head: Normocephalic and atraumatic.   Right Ear: External ear normal.   Mouth/Throat: Oropharynx is clear and moist.   Eyes: EOM are normal. Pupils are equal, round, and reactive to light.   Neck: Neck supple. No thyromegaly present.   Cardiovascular: Normal rate, regular rhythm and normal heart sounds.  Exam reveals no gallop and no friction rub.    No murmur heard.  Pulmonary/Chest: Breath sounds normal. She has no wheezes. She has no rales. She exhibits no tenderness.   Abdominal: There is no tenderness. There is no rebound and no guarding.       Right Side Rheumatological Exam     Examination finds the elbow, 1st MCP, 2nd MCP, 3rd MCP, 4th MCP and 5th MCP normal.    The patient is tender to palpation of the shoulder, elbow, wrist, knee, 1st PIP, 1st MCP, 2nd PIP, 2nd MCP, 3rd PIP, 3rd MCP, 4th PIP, 4th MCP, 5th PIP and 5th MCP    She has swelling of the knee, 1st PIP, 1st MCP, 2nd PIP, 2nd MCP, 3rd PIP, 3rd MCP, 4th PIP, 4th MCP, 5th PIP and 5th MCP    The patient has an enlarged wrist, 1st PIP, 2nd PIP, 3rd PIP, 4th PIP and 5th PIP    Shoulder Exam   Tenderness Location: acromion    Range of Motion   Active Abduction: abnormal   Adduction: abnormal  Sensation: normal    Knee Exam   Tenderness Location: medial joint line  Patellofemoral Crepitus: positive  Effusion: positive  Sensation: normal    Hip Exam   Tenderness Location: no  tenderness  Sensation: normal    Elbow/Wrist Exam   Tenderness Location: no tenderness  Sensation: normal    Foot Exam   Right foot exam exhibits signs of inflamed dorsum  Right foot exam exhibits signs of no podagra, no tophus and no plantar fasciitis    Muscle Strength (0-5 scale):  : 4/5     Left Side Rheumatological Exam     Examination finds the elbow, knee, 1st MCP, 2nd MCP, 3rd MCP, 4th MCP and 5th MCP normal.    The patient is tender to palpation of the shoulder, elbow, wrist, knee, 1st PIP, 1st MCP, 2nd PIP, 2nd MCP, 3rd PIP, 3rd MCP, 4th PIP, 4th MCP, 5th PIP and 5th MCP.    She has swelling of the 1st PIP, 1st MCP, 2nd PIP, 2nd MCP, 3rd PIP, 3rd MCP, 4th PIP, 4th MCP, 5th PIP and 5th MCP    The patient has an enlarged wrist, 1st PIP, 2nd PIP, 3rd PIP, 4th PIP and 5th PIP.    Shoulder Exam   Tenderness Location: acromion    Range of Motion   Active Abduction: abnormal   Sensation: normal    Knee Exam   Tenderness Location: lateral joint line and medial joint line    Patellofemoral Crepitus: positive  Effusion: positive  Sensation: normal    Hip Exam   Tenderness Location: no tenderness  Sensation: normal    Elbow/Wrist Exam   Tenderness Location: lateral epicondyle and medial epicondyle  Sensation: normal    Foot Exam   Left foot exam exhibits signs of inflamed dorsum  Left foot exam exhibits signs of no podagra and no plantar fasciitis    Muscle Strength (0-5 scale):  :  4/5       Back/Neck Exam   General Inspection   Gait: normal       Tenderness Right paramedian tenderness of the Lower C-Spine and Lower L-Spine.Left paramedian tenderness of the Upper C-Spine and Lower L-Spine.      Comments:  15 out of 18 tender points    Lymphadenopathy:     She has no cervical adenopathy.   Neurological: She is alert and oriented to person, place, and time.   Reflex Scores:       Patellar reflexes are 3+ on the right side and 3+ on the left side.  Skin: No rash noted. No erythema. No pallor.     Psychiatric:  Mood and affect normal.   Musculoskeletal: She exhibits edema, tenderness and deformity.   ra changes on hands pips and B wrist are fusing          hla b 27 pos,  Ankit 1;320    Assessment:         Encounter Diagnoses   Name Primary?    Rheumatoid arthritis, involving unspecified site, unspecified rheumatoid factor presence Yes    Ankylosing spondylitis, unspecified site of spine     Systemic lupus erythematosus, unspecified SLE type, unspecified organ involvement status     Psoriatic arthritis     Severe muscle deconditioning     Anemia, unspecified type     Abnormal SPEP          Plan:   Rheumatoid arthritis, involving unspecified site, unspecified rheumatoid factor presence  -     azithromycin (Z-KIMBERLY) 250 MG tablet; Take 2 tablets by mouth on day 1; Take 1 tablet by mouth on days 2-5  Dispense: 6 tablet; Refill: 4  -     fluconazole (DIFLUCAN) 150 MG Tab; TK 1 T PO qd  Dispense: 15 tablet; Refill: 3  -     Aldolase; Future; Expected date: 03/01/2018  -     CK; Future; Expected date: 03/01/2018  -     ANKIT; Future; Expected date: 03/01/2018  -     Anti Sm/RNP Antibody; Future; Expected date: 03/01/2018  -     Anti-DNA antibody, double-stranded; Future; Expected date: 03/01/2018  -     Anti-Histone Antibody; Future; Expected date: 03/01/2018  -     Anti-scleroderma antibody; Future; Expected date: 03/01/2018  -     Anti-Smith antibody; Future; Expected date: 03/01/2018  -     TSH; Future; Expected date: 03/01/2018  -     T4, free; Future; Expected date: 03/01/2018  -     T3, free; Future; Expected date: 03/01/2018  -     Sedimentation rate, manual; Future; Expected date: 03/01/2018  -     Rheumatoid factor; Future; Expected date: 03/01/2018  -     Protein electrophoresis, serum; Future; Expected date: 03/01/2018    Ankylosing spondylitis, unspecified site of spine  -     azithromycin (Z-KIMBERLY) 250 MG tablet; Take 2 tablets by mouth on day 1; Take 1 tablet by mouth on days 2-5  Dispense: 6 tablet; Refill: 4  -      fluconazole (DIFLUCAN) 150 MG Tab; TK 1 T PO qd  Dispense: 15 tablet; Refill: 3  -     Aldolase; Future; Expected date: 03/01/2018  -     CK; Future; Expected date: 03/01/2018  -     ANKIT; Future; Expected date: 03/01/2018  -     Anti Sm/RNP Antibody; Future; Expected date: 03/01/2018  -     Anti-DNA antibody, double-stranded; Future; Expected date: 03/01/2018  -     Anti-Histone Antibody; Future; Expected date: 03/01/2018  -     Anti-scleroderma antibody; Future; Expected date: 03/01/2018  -     Anti-Smith antibody; Future; Expected date: 03/01/2018  -     TSH; Future; Expected date: 03/01/2018  -     T4, free; Future; Expected date: 03/01/2018  -     T3, free; Future; Expected date: 03/01/2018  -     Sedimentation rate, manual; Future; Expected date: 03/01/2018  -     Rheumatoid factor; Future; Expected date: 03/01/2018  -     Protein electrophoresis, serum; Future; Expected date: 03/01/2018    Systemic lupus erythematosus, unspecified SLE type, unspecified organ involvement status  -     azithromycin (Z-KIMBERLY) 250 MG tablet; Take 2 tablets by mouth on day 1; Take 1 tablet by mouth on days 2-5  Dispense: 6 tablet; Refill: 4  -     fluconazole (DIFLUCAN) 150 MG Tab; TK 1 T PO qd  Dispense: 15 tablet; Refill: 3  -     Aldolase; Future; Expected date: 03/01/2018  -     CK; Future; Expected date: 03/01/2018  -     ANKIT; Future; Expected date: 03/01/2018  -     Anti Sm/RNP Antibody; Future; Expected date: 03/01/2018  -     Anti-DNA antibody, double-stranded; Future; Expected date: 03/01/2018  -     Anti-Histone Antibody; Future; Expected date: 03/01/2018  -     Anti-scleroderma antibody; Future; Expected date: 03/01/2018  -     Anti-Smith antibody; Future; Expected date: 03/01/2018  -     TSH; Future; Expected date: 03/01/2018  -     T4, free; Future; Expected date: 03/01/2018  -     T3, free; Future; Expected date: 03/01/2018  -     Sedimentation rate, manual; Future; Expected date: 03/01/2018  -     Rheumatoid factor;  Future; Expected date: 03/01/2018  -     Protein electrophoresis, serum; Future; Expected date: 03/01/2018  -     acetaminophen tablet 650 mg; Take 2 tablets (650 mg total) by mouth one time.  -     diphenhydrAMINE (BENADRYL) 25 mg in sodium chloride 0.9% 50 mL IVPB; Inject 25 mg into the vein one time.  -     belimumab 476 mg in sodium chloride 0.9% 250 mL IVPB; Inject 476 mg into the vein one time.  -     belimumab 476 mg in sodium chloride 0.9% 250 mL IVPB; Inject 476 mg into the vein one time.  -     sodium chloride 0.9% flush 10 mL; Inject 10 mLs into the vein as needed for Line Care.  -     sodium chloride 0.9% 100 mL flush bag; Inject into the vein as needed for flush bag.  -     heparin, porcine (PF) 100 unit/mL injection flush 500 Units; Inject 5 mLs (500 Units total) into the vein as needed for Line Care.    Psoriatic arthritis  -     azithromycin (Z-KIMBERLY) 250 MG tablet; Take 2 tablets by mouth on day 1; Take 1 tablet by mouth on days 2-5  Dispense: 6 tablet; Refill: 4  -     fluconazole (DIFLUCAN) 150 MG Tab; TK 1 T PO qd  Dispense: 15 tablet; Refill: 3  -     Aldolase; Future; Expected date: 03/01/2018  -     CK; Future; Expected date: 03/01/2018  -     ANKIT; Future; Expected date: 03/01/2018  -     Anti Sm/RNP Antibody; Future; Expected date: 03/01/2018  -     Anti-DNA antibody, double-stranded; Future; Expected date: 03/01/2018  -     Anti-Histone Antibody; Future; Expected date: 03/01/2018  -     Anti-scleroderma antibody; Future; Expected date: 03/01/2018  -     Anti-Smith antibody; Future; Expected date: 03/01/2018  -     TSH; Future; Expected date: 03/01/2018  -     T4, free; Future; Expected date: 03/01/2018  -     T3, free; Future; Expected date: 03/01/2018  -     Sedimentation rate, manual; Future; Expected date: 03/01/2018  -     Rheumatoid factor; Future; Expected date: 03/01/2018  -     Protein electrophoresis, serum; Future; Expected date: 03/01/2018    Severe muscle deconditioning  -      azithromycin (Z-KIMBERLY) 250 MG tablet; Take 2 tablets by mouth on day 1; Take 1 tablet by mouth on days 2-5  Dispense: 6 tablet; Refill: 4  -     fluconazole (DIFLUCAN) 150 MG Tab; TK 1 T PO qd  Dispense: 15 tablet; Refill: 3  -     Aldolase; Future; Expected date: 03/01/2018  -     CK; Future; Expected date: 03/01/2018  -     ANKIT; Future; Expected date: 03/01/2018  -     Anti Sm/RNP Antibody; Future; Expected date: 03/01/2018  -     Anti-DNA antibody, double-stranded; Future; Expected date: 03/01/2018  -     Anti-Histone Antibody; Future; Expected date: 03/01/2018  -     Anti-scleroderma antibody; Future; Expected date: 03/01/2018  -     Anti-Smith antibody; Future; Expected date: 03/01/2018  -     TSH; Future; Expected date: 03/01/2018  -     T4, free; Future; Expected date: 03/01/2018  -     T3, free; Future; Expected date: 03/01/2018  -     Sedimentation rate, manual; Future; Expected date: 03/01/2018  -     Rheumatoid factor; Future; Expected date: 03/01/2018  -     Protein electrophoresis, serum; Future; Expected date: 03/01/2018    Anemia, unspecified type  -     Ambulatory consult to Hematology / Oncology    Abnormal SPEP  -     Ambulatory consult to Hematology / Oncology    Other orders  -     acetaminophen tablet 650 mg; Take 2 tablets (650 mg total) by mouth one time.  -     methylPREDNISolone sodium succinate (SOLU-MEDROL) 100 mg in dextrose 5 % 100 mL IVPB; Inject into the vein one time.  -     diphenhydrAMINE (BENADRYL) 25 mg in sodium chloride 0.9% 50 mL IVPB; Inject 25 mg into the vein one time.  -     belimumab 476 mg in sodium chloride 0.9% 250 mL IVPB; Inject 476 mg into the vein one time.  -     sodium chloride 0.9% flush 10 mL; Inject 10 mLs into the vein as needed for Line Care.  -     sodium chloride 0.9% 100 mL flush bag; Inject into the vein as needed for flush bag.  -     heparin, porcine (PF) 100 unit/mL injection flush 500 Units; Inject 5 mLs (500 Units total) into the vein as needed for  Line Care.        we cant restart ivig, and possibly start lupus eg benlysta and/ or consider cytoxan   Consider inpatient PT

## 2018-03-02 NOTE — TELEPHONE ENCOUNTER
Pt needs appt asap with [facility] for bone marrow biopsy. Last seen [facility] 2015 for bone marrow. Referral in from Dr. Montaño.

## 2018-03-19 ENCOUNTER — PATIENT MESSAGE (OUTPATIENT)
Dept: RHEUMATOLOGY | Facility: CLINIC | Age: 43
End: 2018-03-19

## 2018-03-20 ENCOUNTER — PATIENT MESSAGE (OUTPATIENT)
Dept: RHEUMATOLOGY | Facility: CLINIC | Age: 43
End: 2018-03-20

## 2018-03-20 ENCOUNTER — INFUSION (OUTPATIENT)
Dept: INFUSION THERAPY | Facility: HOSPITAL | Age: 43
End: 2018-03-20
Attending: INTERNAL MEDICINE
Payer: MEDICARE

## 2018-03-20 VITALS
RESPIRATION RATE: 18 BRPM | WEIGHT: 105 LBS | SYSTOLIC BLOOD PRESSURE: 114 MMHG | HEIGHT: 60 IN | BODY MASS INDEX: 20.62 KG/M2 | DIASTOLIC BLOOD PRESSURE: 80 MMHG | HEART RATE: 99 BPM | TEMPERATURE: 100 F

## 2018-03-20 DIAGNOSIS — R29.898 SEVERE MUSCLE DECONDITIONING: ICD-10-CM

## 2018-03-20 DIAGNOSIS — L40.50 PSORIATIC ARTHRITIS: ICD-10-CM

## 2018-03-20 DIAGNOSIS — M45.9 ANKYLOSING SPONDYLITIS, UNSPECIFIED SITE OF SPINE: ICD-10-CM

## 2018-03-20 DIAGNOSIS — M32.9 SYSTEMIC LUPUS ERYTHEMATOSUS, UNSPECIFIED SLE TYPE, UNSPECIFIED ORGAN INVOLVEMENT STATUS: ICD-10-CM

## 2018-03-20 DIAGNOSIS — D83.9 CVID (COMMON VARIABLE IMMUNODEFICIENCY): ICD-10-CM

## 2018-03-20 DIAGNOSIS — M06.9 RHEUMATOID ARTHRITIS, INVOLVING UNSPECIFIED SITE, UNSPECIFIED RHEUMATOID FACTOR PRESENCE: Primary | ICD-10-CM

## 2018-03-20 LAB
CK SERPL-CCNC: <20 U/L
ERYTHROCYTE [SEDIMENTATION RATE] IN BLOOD: >110 MM/HR
RHEUMATOID FACT SERPL-ACNC: <8.6 IU/ML
T3FREE SP1 P CHAL SERPL-SCNC: 9.89 PG/ML
T4 FREE SP9 P CHAL SERPL-SCNC: 2.09 NG/DL
TSH SERPL DL<=0.005 MIU/L-ACNC: <0.015 UIU/ML

## 2018-03-20 PROCEDURE — 86038 ANTINUCLEAR ANTIBODIES: CPT

## 2018-03-20 PROCEDURE — 84481 FREE ASSAY (FT-3): CPT

## 2018-03-20 PROCEDURE — 86235 NUCLEAR ANTIGEN ANTIBODY: CPT | Mod: 59

## 2018-03-20 PROCEDURE — 84439 ASSAY OF FREE THYROXINE: CPT | Mod: PN

## 2018-03-20 PROCEDURE — 86225 DNA ANTIBODY NATIVE: CPT

## 2018-03-20 PROCEDURE — 85652 RBC SED RATE AUTOMATED: CPT | Mod: PN

## 2018-03-20 PROCEDURE — 82550 ASSAY OF CK (CPK): CPT

## 2018-03-20 PROCEDURE — 96413 CHEMO IV INFUSION 1 HR: CPT | Mod: PN

## 2018-03-20 PROCEDURE — 86431 RHEUMATOID FACTOR QUANT: CPT

## 2018-03-20 PROCEDURE — 84443 ASSAY THYROID STIM HORMONE: CPT | Mod: PN

## 2018-03-20 PROCEDURE — 96367 TX/PROPH/DG ADDL SEQ IV INF: CPT | Mod: PN

## 2018-03-20 PROCEDURE — 82550 ASSAY OF CK (CPK): CPT | Mod: PN

## 2018-03-20 PROCEDURE — A4216 STERILE WATER/SALINE, 10 ML: HCPCS | Mod: PN | Performed by: INTERNAL MEDICINE

## 2018-03-20 PROCEDURE — 84439 ASSAY OF FREE THYROXINE: CPT

## 2018-03-20 PROCEDURE — 86235 NUCLEAR ANTIGEN ANTIBODY: CPT

## 2018-03-20 PROCEDURE — 82085 ASSAY OF ALDOLASE: CPT

## 2018-03-20 PROCEDURE — 86039 ANTINUCLEAR ANTIBODIES (ANA): CPT

## 2018-03-20 PROCEDURE — 84165 PROTEIN E-PHORESIS SERUM: CPT

## 2018-03-20 PROCEDURE — 84443 ASSAY THYROID STIM HORMONE: CPT

## 2018-03-20 PROCEDURE — 84481 FREE ASSAY (FT-3): CPT | Mod: PN

## 2018-03-20 PROCEDURE — 96375 TX/PRO/DX INJ NEW DRUG ADDON: CPT | Mod: PN

## 2018-03-20 PROCEDURE — 25000003 PHARM REV CODE 250: Mod: PN | Performed by: INTERNAL MEDICINE

## 2018-03-20 PROCEDURE — 63600175 PHARM REV CODE 636 W HCPCS: Mod: PN | Performed by: INTERNAL MEDICINE

## 2018-03-20 PROCEDURE — 86334 IMMUNOFIX E-PHORESIS SERUM: CPT | Mod: 26,,, | Performed by: PATHOLOGY

## 2018-03-20 PROCEDURE — 83516 IMMUNOASSAY NONANTIBODY: CPT

## 2018-03-20 PROCEDURE — 86334 IMMUNOFIX E-PHORESIS SERUM: CPT

## 2018-03-20 PROCEDURE — 86431 RHEUMATOID FACTOR QUANT: CPT | Mod: PN

## 2018-03-20 PROCEDURE — 85652 RBC SED RATE AUTOMATED: CPT

## 2018-03-20 PROCEDURE — 84165 PROTEIN E-PHORESIS SERUM: CPT | Mod: 26,,, | Performed by: PATHOLOGY

## 2018-03-20 RX ORDER — SODIUM CHLORIDE 0.9 % (FLUSH) 0.9 %
10 SYRINGE (ML) INJECTION
Status: CANCELLED | OUTPATIENT
Start: 2018-03-20

## 2018-03-20 RX ORDER — ACETAMINOPHEN 325 MG/1
650 TABLET ORAL
Status: CANCELLED | OUTPATIENT
Start: 2018-03-20

## 2018-03-20 RX ORDER — HEPARIN 100 UNIT/ML
500 SYRINGE INTRAVENOUS
Status: CANCELLED | OUTPATIENT
Start: 2018-03-20

## 2018-03-20 RX ORDER — HEPARIN 100 UNIT/ML
500 SYRINGE INTRAVENOUS
Status: DISCONTINUED | OUTPATIENT
Start: 2018-03-20 | End: 2018-03-20 | Stop reason: HOSPADM

## 2018-03-20 RX ORDER — ACETAMINOPHEN 325 MG/1
650 TABLET ORAL
Status: COMPLETED | OUTPATIENT
Start: 2018-03-20 | End: 2018-03-20

## 2018-03-20 RX ORDER — DIPHENHYDRAMINE HYDROCHLORIDE 50 MG/ML
25 INJECTION INTRAMUSCULAR; INTRAVENOUS
Status: COMPLETED | OUTPATIENT
Start: 2018-03-20 | End: 2018-03-20

## 2018-03-20 RX ORDER — SODIUM CHLORIDE 0.9 % (FLUSH) 0.9 %
10 SYRINGE (ML) INJECTION
Status: DISCONTINUED | OUTPATIENT
Start: 2018-03-20 | End: 2018-03-20 | Stop reason: HOSPADM

## 2018-03-20 RX ADMIN — DIPHENHYDRAMINE HYDROCHLORIDE 25 MG: 50 INJECTION, SOLUTION INTRAMUSCULAR; INTRAVENOUS at 03:03

## 2018-03-20 RX ADMIN — SODIUM CHLORIDE: 900 INJECTION, SOLUTION INTRAVENOUS at 03:03

## 2018-03-20 RX ADMIN — BELIMUMAB 476 MG: 400 INJECTION, POWDER, LYOPHILIZED, FOR SOLUTION INTRAVENOUS at 03:03

## 2018-03-20 RX ADMIN — SODIUM CHLORIDE, PRESERVATIVE FREE 10 ML: 5 INJECTION INTRAVENOUS at 04:03

## 2018-03-20 RX ADMIN — ACETAMINOPHEN 650 MG: 325 TABLET, FILM COATED ORAL at 03:03

## 2018-03-20 NOTE — PLAN OF CARE
Problem: Patient Care Overview  Goal: Plan of Care Review  Outcome: Ongoing (interventions implemented as appropriate)  Pt had labs drawn in clinic as ordered, pt tolerated infusion well, NAD, pt's mom given AVS with appointment schedule, pt d/c from clinic and left via her own WC accompanied by mom.

## 2018-03-21 LAB
ALBUMIN SERPL ELPH-MCNC: 3.22 G/DL
ALDOLASE SERPL-CCNC: 5.6 U/L
ALPHA1 GLOB SERPL ELPH-MCNC: 0.61 G/DL
ALPHA2 GLOB SERPL ELPH-MCNC: 1.14 G/DL
ANA SER QL IF: POSITIVE
ANA TITR SER IF: NORMAL {TITER}
B-GLOBULIN SERPL ELPH-MCNC: 0.94 G/DL
DSDNA AB SER-ACNC: NORMAL [IU]/ML
GAMMA GLOB SERPL ELPH-MCNC: 1.99 G/DL
PROT SERPL-MCNC: 7.9 G/DL

## 2018-03-22 LAB
ANTI SM ANTIBODY: 0.77 EU
ANTI SM ANTIBODY: 0.77 EU
ANTI SM/RNP ANTIBODY: 0.59 EU
ANTI SM/RNP ANTIBODY: 0.59 EU
ANTI-SM INTERPRETATION: NEGATIVE
ANTI-SM INTERPRETATION: NEGATIVE
ANTI-SM/RNP INTERPRETATION: NEGATIVE
ANTI-SM/RNP INTERPRETATION: NEGATIVE
ANTI-SSA ANTIBODY: 1.35 EU
ANTI-SSA INTERPRETATION: NEGATIVE
ANTI-SSB ANTIBODY: 0.47 EU
ANTI-SSB INTERPRETATION: NEGATIVE
DSDNA AB SER-ACNC: NORMAL [IU]/ML
ENA SCL70 AB SER-ACNC: 4 UNITS
INTERPRETATION SERPL IFE-IMP: NORMAL
PATHOLOGIST INTERPRETATION IFE: NORMAL

## 2018-03-23 LAB
HISTONE IGG SER IA-ACNC: 0.2 UNITS (ref 0–0.9)
PATHOLOGIST INTERPRETATION SPE: NORMAL

## 2018-03-26 ENCOUNTER — TELEPHONE (OUTPATIENT)
Dept: RHEUMATOLOGY | Facility: CLINIC | Age: 43
End: 2018-03-26

## 2018-03-26 NOTE — TELEPHONE ENCOUNTER
----- Message from Hattie Curtis sent at 3/26/2018 12:13 PM CDT -----  Contact: Betty Aguilera calling from [employer]   Betty Aguilera calling from [employer] is calling in regards to pt. Betty can be reached at [phone]

## 2018-03-27 ENCOUNTER — TELEPHONE (OUTPATIENT)
Dept: RHEUMATOLOGY | Facility: CLINIC | Age: 43
End: 2018-03-27

## 2018-03-27 NOTE — TELEPHONE ENCOUNTER
benlysta is every 2 weeks x 3 her infusion are not scheduled correctly discussed with pt and will inform infusion

## 2018-04-03 ENCOUNTER — INFUSION (OUTPATIENT)
Dept: INFUSION THERAPY | Facility: HOSPITAL | Age: 43
End: 2018-04-03
Attending: INTERNAL MEDICINE
Payer: MEDICARE

## 2018-04-03 VITALS
BODY MASS INDEX: 20.62 KG/M2 | SYSTOLIC BLOOD PRESSURE: 132 MMHG | HEART RATE: 88 BPM | TEMPERATURE: 99 F | HEIGHT: 60 IN | OXYGEN SATURATION: 99 % | DIASTOLIC BLOOD PRESSURE: 67 MMHG | RESPIRATION RATE: 20 BRPM | WEIGHT: 105 LBS

## 2018-04-03 DIAGNOSIS — D83.9 CVID (COMMON VARIABLE IMMUNODEFICIENCY): ICD-10-CM

## 2018-04-03 DIAGNOSIS — M33.20 IDIOPATHIC POLYMYOSITIS: ICD-10-CM

## 2018-04-03 DIAGNOSIS — D50.9 IRON DEFICIENCY ANEMIA, UNSPECIFIED IRON DEFICIENCY ANEMIA TYPE: ICD-10-CM

## 2018-04-03 DIAGNOSIS — M32.9 SYSTEMIC LUPUS ERYTHEMATOSUS, UNSPECIFIED SLE TYPE, UNSPECIFIED ORGAN INVOLVEMENT STATUS: Primary | ICD-10-CM

## 2018-04-03 PROCEDURE — 96375 TX/PRO/DX INJ NEW DRUG ADDON: CPT | Mod: PN

## 2018-04-03 PROCEDURE — 63600175 PHARM REV CODE 636 W HCPCS: Mod: TB,PN | Performed by: INTERNAL MEDICINE

## 2018-04-03 PROCEDURE — 25000003 PHARM REV CODE 250: Mod: PN | Performed by: INTERNAL MEDICINE

## 2018-04-03 PROCEDURE — 96413 CHEMO IV INFUSION 1 HR: CPT | Mod: PN

## 2018-04-03 RX ORDER — SODIUM CHLORIDE 0.9 % (FLUSH) 0.9 %
10 SYRINGE (ML) INJECTION
Status: CANCELLED | OUTPATIENT
Start: 2018-04-03

## 2018-04-03 RX ORDER — ACETAMINOPHEN 325 MG/1
650 TABLET ORAL
Status: COMPLETED | OUTPATIENT
Start: 2018-04-03 | End: 2018-04-03

## 2018-04-03 RX ORDER — HEPARIN 100 UNIT/ML
500 SYRINGE INTRAVENOUS
Status: CANCELLED | OUTPATIENT
Start: 2018-04-03

## 2018-04-03 RX ORDER — SODIUM CHLORIDE 0.9 % (FLUSH) 0.9 %
10 SYRINGE (ML) INJECTION
Status: DISCONTINUED | OUTPATIENT
Start: 2018-04-03 | End: 2018-04-03 | Stop reason: HOSPADM

## 2018-04-03 RX ORDER — ACETAMINOPHEN 325 MG/1
650 TABLET ORAL
Status: CANCELLED | OUTPATIENT
Start: 2018-04-03

## 2018-04-03 RX ORDER — HEPARIN 100 UNIT/ML
500 SYRINGE INTRAVENOUS
Status: DISCONTINUED | OUTPATIENT
Start: 2018-04-03 | End: 2018-04-03 | Stop reason: HOSPADM

## 2018-04-03 RX ORDER — DIPHENHYDRAMINE HYDROCHLORIDE 50 MG/ML
25 INJECTION INTRAMUSCULAR; INTRAVENOUS
Status: COMPLETED | OUTPATIENT
Start: 2018-04-03 | End: 2018-04-03

## 2018-04-03 RX ADMIN — SODIUM CHLORIDE: 9 INJECTION, SOLUTION INTRAVENOUS at 01:04

## 2018-04-03 RX ADMIN — ACETAMINOPHEN 650 MG: 325 TABLET, FILM COATED ORAL at 01:04

## 2018-04-03 RX ADMIN — DIPHENHYDRAMINE HYDROCHLORIDE 25 MG: 50 INJECTION, SOLUTION INTRAMUSCULAR; INTRAVENOUS at 01:04

## 2018-04-03 RX ADMIN — BELIMUMAB 470 MG: 400 INJECTION, POWDER, LYOPHILIZED, FOR SOLUTION INTRAVENOUS at 01:04

## 2018-04-12 ENCOUNTER — PATIENT MESSAGE (OUTPATIENT)
Dept: RHEUMATOLOGY | Facility: CLINIC | Age: 43
End: 2018-04-12

## 2018-04-16 ENCOUNTER — TELEPHONE (OUTPATIENT)
Dept: HEMATOLOGY/ONCOLOGY | Facility: CLINIC | Age: 43
End: 2018-04-16

## 2018-04-16 ENCOUNTER — PATIENT MESSAGE (OUTPATIENT)
Dept: RHEUMATOLOGY | Facility: CLINIC | Age: 43
End: 2018-04-16

## 2018-04-17 ENCOUNTER — OFFICE VISIT (OUTPATIENT)
Dept: HEMATOLOGY/ONCOLOGY | Facility: CLINIC | Age: 43
End: 2018-04-17
Payer: MEDICARE

## 2018-04-17 ENCOUNTER — INFUSION (OUTPATIENT)
Dept: INFUSION THERAPY | Facility: HOSPITAL | Age: 43
End: 2018-04-17
Attending: INTERNAL MEDICINE
Payer: MEDICARE

## 2018-04-17 VITALS
WEIGHT: 105 LBS | BODY MASS INDEX: 20.62 KG/M2 | TEMPERATURE: 99 F | HEART RATE: 106 BPM | RESPIRATION RATE: 16 BRPM | DIASTOLIC BLOOD PRESSURE: 81 MMHG | HEIGHT: 60 IN | SYSTOLIC BLOOD PRESSURE: 139 MMHG

## 2018-04-17 VITALS
HEIGHT: 60 IN | HEART RATE: 106 BPM | RESPIRATION RATE: 16 BRPM | DIASTOLIC BLOOD PRESSURE: 81 MMHG | SYSTOLIC BLOOD PRESSURE: 139 MMHG | BODY MASS INDEX: 20.62 KG/M2 | TEMPERATURE: 99 F | WEIGHT: 105 LBS

## 2018-04-17 DIAGNOSIS — M32.9 SYSTEMIC LUPUS ERYTHEMATOSUS, UNSPECIFIED SLE TYPE, UNSPECIFIED ORGAN INVOLVEMENT STATUS: Primary | ICD-10-CM

## 2018-04-17 DIAGNOSIS — D89.0 POLYCLONAL GAMMOPATHY: ICD-10-CM

## 2018-04-17 DIAGNOSIS — D63.8 ANEMIA, CHRONIC DISEASE: ICD-10-CM

## 2018-04-17 DIAGNOSIS — D83.9 CVID (COMMON VARIABLE IMMUNODEFICIENCY): ICD-10-CM

## 2018-04-17 DIAGNOSIS — R59.1 LYMPHADENOPATHY: ICD-10-CM

## 2018-04-17 DIAGNOSIS — M06.9 RHEUMATOID ARTHRITIS OF HAND, UNSPECIFIED LATERALITY, UNSPECIFIED RHEUMATOID FACTOR PRESENCE: Primary | ICD-10-CM

## 2018-04-17 PROCEDURE — 63600175 PHARM REV CODE 636 W HCPCS: Mod: PN | Performed by: INTERNAL MEDICINE

## 2018-04-17 PROCEDURE — 25000003 PHARM REV CODE 250: Mod: PN | Performed by: INTERNAL MEDICINE

## 2018-04-17 PROCEDURE — 99215 OFFICE O/P EST HI 40 MIN: CPT | Mod: S$PBB,,, | Performed by: INTERNAL MEDICINE

## 2018-04-17 PROCEDURE — 96413 CHEMO IV INFUSION 1 HR: CPT | Mod: PN

## 2018-04-17 PROCEDURE — 99999 PR PBB SHADOW E&M-EST. PATIENT-LVL V: CPT | Mod: PBBFAC,,, | Performed by: INTERNAL MEDICINE

## 2018-04-17 PROCEDURE — 96375 TX/PRO/DX INJ NEW DRUG ADDON: CPT | Mod: PN

## 2018-04-17 PROCEDURE — 99215 OFFICE O/P EST HI 40 MIN: CPT | Mod: PBBFAC,25,PN | Performed by: INTERNAL MEDICINE

## 2018-04-17 RX ORDER — SODIUM CHLORIDE 0.9 % (FLUSH) 0.9 %
10 SYRINGE (ML) INJECTION
Status: CANCELLED | OUTPATIENT
Start: 2018-04-17

## 2018-04-17 RX ORDER — SODIUM CHLORIDE 0.9 % (FLUSH) 0.9 %
10 SYRINGE (ML) INJECTION
Status: DISCONTINUED | OUTPATIENT
Start: 2018-04-17 | End: 2018-04-17 | Stop reason: HOSPADM

## 2018-04-17 RX ORDER — DIPHENHYDRAMINE HYDROCHLORIDE 50 MG/ML
25 INJECTION INTRAMUSCULAR; INTRAVENOUS
Status: COMPLETED | OUTPATIENT
Start: 2018-04-17 | End: 2018-04-17

## 2018-04-17 RX ORDER — HEPARIN 100 UNIT/ML
500 SYRINGE INTRAVENOUS
Status: CANCELLED | OUTPATIENT
Start: 2018-04-17

## 2018-04-17 RX ORDER — ACETAMINOPHEN 325 MG/1
650 TABLET ORAL
Status: CANCELLED | OUTPATIENT
Start: 2018-04-17

## 2018-04-17 RX ORDER — ACETAMINOPHEN 325 MG/1
650 TABLET ORAL
Status: COMPLETED | OUTPATIENT
Start: 2018-04-17 | End: 2018-04-17

## 2018-04-17 RX ADMIN — BELIMUMAB 476 MG: 400 INJECTION, POWDER, LYOPHILIZED, FOR SOLUTION INTRAVENOUS at 01:04

## 2018-04-17 RX ADMIN — ACETAMINOPHEN 650 MG: 325 TABLET, FILM COATED ORAL at 12:04

## 2018-04-17 RX ADMIN — DIPHENHYDRAMINE HYDROCHLORIDE 25 MG: 50 INJECTION, SOLUTION INTRAMUSCULAR; INTRAVENOUS at 12:04

## 2018-04-17 RX ADMIN — SODIUM CHLORIDE: 0.9 INJECTION, SOLUTION INTRAVENOUS at 12:04

## 2018-04-17 NOTE — TELEPHONE ENCOUNTER
----- Message from Paige Jacome sent at 4/16/2018  4:22 PM CDT -----  Contact: self  Pt is calling in regards to being seen at the infusion suite or if she can come during her infusion on tomorrow.    Pt would like a call back at [phone].    Thank you

## 2018-04-17 NOTE — TELEPHONE ENCOUNTER
Spoke with pt. Pt states she is waiting on fire department to come put her in her wheel chair then she will be on her way to appointment. Pt states she will be at appointment however she may not be here on time due to waiting for that assistance.

## 2018-04-17 NOTE — PROGRESS NOTES
History of present illness:  The patient is a 42-year-old white female with severe rheumatoid arthritis who has been receiving a variety of biologic treatments, most recently Benlysta, and remains confined to a motorized wheelchair due to severe deformity of her limbs, chronic pain syndrome, diminished range of motion.  The patient is known to me from prior evaluation obtain and 2015 regarding chronic anemia and abnormal SPEP.  The patient appears to have a polyclonal gammopathy which I believed to be related to chronic inflammatory state.  Bone marrow aspiration and biopsy performed for further investigation of anemia did not reveal any specific marrow pathology.  Since her last office visit in my clinic on 9/16/ 15, the patient is also been evaluated by Dr. Bellamy who also felt the patient was suffering from polyclonal gammopathy due to inflammation and did not feel that repeat bone marrow aspiration and biopsy was warranted.  Patient now returns to clinic at the urging of Dr. Montaño, who is requesting repeat bone marrow as SPEP remains abnormal.  Review of the most recent electrophoresis continues to reveal a polyclonal gammopathy and not a monoclonal paraprotein which would be suggestive of possible underlying malignancy.  However, the patient has new complaints of painful lymphadenopathy.  These wax and wane over time and seem to occur following receipt of biologic therapy for her rheumatoid arthritis.  These lymph nodes have arisen near her ears, within her neck, in her axillary regions, and beneath her breasts.  There are largest and most tender after treatment for her RA and diminish over time.  These episodes are not associated with fevers, drenching night sweats, or weight loss.  Patient denies pruritus.  Patient is due for next Benlysta treatment today.  No other complaints or pertinent findings on a 14 point review of systems.    Physical examination:  Well-developed, ill-appearing, frail, white female, who  is confined to a motorized wheelchair during examination but remains alert and oriented ×4.  HEENT: Normocephalic, atraumatic. Oral mucosa pink and moist. Lips without   lesions. Tongue midline. Oropharynx clear. Nonicteric sclerae.   NECK: Decreased range of motion, small/soft palpable lymph node beneath the patient's right ear. No carotid bruits, thyromegaly or thyroid nodule.   HEART: Regular rate and rhythm without murmur, gallop or rub.   LUNGS: Clear to auscultation bilaterally. Normal respiratory effort.   ABDOMEN: Soft, nontender, nondistended with positive normoactive bowel sounds,   no hepatosplenomegaly.   EXTREMITIES: Numerous joint deformities and nodules overlying most of the patient's joints.    AXILLAE AND GROIN: No palpable pathologic lymphadenopathy is appreciated.   SKIN: Intact/turgor normal   NEUROLOGIC: Cannot be fully assessed due to patient's confinement in wheelchair and generalized weakness.    Laboratory:   Most recent CBC was obtained on 2/19/18 and includes a white blood cell count of 6.6, hemoglobin 9.4, hematocrit 30.3, MCV 88, platelets 401.  Differential is remarkable for 51% granulocytes, 40% lymphocytes, 7% monocytes.  Serum iron 36, TIBC 187, ferritin 291, percent saturated iron 19, erythropoietin level 23, sedimentation rate greater than 110.  The degree of patient's anemia is stable to have present at the time of my evaluation and 2015.  Chemistry panel from 12/14/17 includes a sodium of 141, potassium 3.7, chloride 99, CO2 31, BUN 12, creatinine 0.4, glucose 91, calcium 9.8, alkaline phosphatase 186, total protein 8.6, albumin 4, total bilirubin 0.9, AST 38, ALT 45, GFR greater than 60.  TSH less than 0.015, free T3 9.89, free T4 2.09.    Impression:  1.  Severe rheumatoid arthritis.  2.  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  3.  Stable/moderate chronic disease physiology anemia.  4.  Waxing/waning lymphadenopathy  temporally related to Benlysta infusions in a patient previously treated with other biologics to control symptoms of #1.  This is concerning for the possibility of treatment associated lymphoproliferative disorder.    Plan:  1.  Proceed with Benlysta infusion as scheduled.  2.  Repeat bone marrow aspiration and biopsy is not warranted at this time.  3.  Supportive management of symptoms related to #3 only.  4.  Patient should undergo further evaluation for possible lymphoproliferative disorder in association with prior biologic therapy of rheumatoid arthritis.  Ideally, this would consist of the total body CT PET scan which would not only provide anatomic demonstration of of any lymphadenopathy present but also insight as to the physiologic activity occurring within such nodes.  5.  Have patient return to clinic to review results of CT PET scan at earliest convenience.  Should she have recurrent lymphadenopathy following her current Benlysta treatment, I would be happy to reexamine the patient at that time and/or have her evaluated for surgical biopsy of any such palpable node.  6.  40 minutes a contact time spent counseling the patient and her caretaker.

## 2018-04-29 ENCOUNTER — TELEPHONE (OUTPATIENT)
Dept: RHEUMATOLOGY | Facility: CLINIC | Age: 43
End: 2018-04-29

## 2018-04-29 DIAGNOSIS — R77.8 ABNORMAL SPEP: ICD-10-CM

## 2018-04-29 DIAGNOSIS — Z79.899 LONG TERM CURRENT USE OF IMMUNOSUPPRESSIVE DRUG: ICD-10-CM

## 2018-04-29 DIAGNOSIS — M32.9 SYSTEMIC LUPUS ERYTHEMATOSUS, UNSPECIFIED SLE TYPE, UNSPECIFIED ORGAN INVOLVEMENT STATUS: ICD-10-CM

## 2018-04-29 DIAGNOSIS — M06.9 RHEUMATOID ARTHRITIS, INVOLVING UNSPECIFIED SITE, UNSPECIFIED RHEUMATOID FACTOR PRESENCE: Primary | ICD-10-CM

## 2018-04-30 NOTE — TELEPHONE ENCOUNTER
Spoke with patient and informed Dr Montaño is aware of PET scan instead of marrow biopsy. Dr Montaño advises patient not to restart MTX and have labs done at next infusion. Patient verbalized understanding.

## 2018-05-14 ENCOUNTER — PATIENT MESSAGE (OUTPATIENT)
Dept: HEMATOLOGY/ONCOLOGY | Facility: CLINIC | Age: 43
End: 2018-05-14

## 2018-05-14 ENCOUNTER — TELEPHONE (OUTPATIENT)
Dept: HEMATOLOGY/ONCOLOGY | Facility: CLINIC | Age: 43
End: 2018-05-14

## 2018-05-14 NOTE — TELEPHONE ENCOUNTER
----- Message from Josie Mcgee sent at 5/14/2018 11:14 AM CDT -----  Contact: Holli  Patient is calling regarding PET Scan that was to be scheduled. Please call [phone]. Thanks!

## 2018-05-14 NOTE — TELEPHONE ENCOUNTER
Attempted to return call to patient to scheduled PET scan, but phone appeared to be busy.   Scheduled PET for 5/22/18 at 11:15 am and follow upon 5/25/18 at 10 am. Sent patient message of appts via My [facility] Patient Portal

## 2018-05-15 ENCOUNTER — INFUSION (OUTPATIENT)
Dept: INFUSION THERAPY | Facility: HOSPITAL | Age: 43
End: 2018-05-15
Attending: INTERNAL MEDICINE
Payer: MEDICARE

## 2018-05-15 VITALS
HEART RATE: 99 BPM | SYSTOLIC BLOOD PRESSURE: 129 MMHG | BODY MASS INDEX: 20.62 KG/M2 | HEIGHT: 60 IN | TEMPERATURE: 100 F | RESPIRATION RATE: 18 BRPM | DIASTOLIC BLOOD PRESSURE: 78 MMHG | OXYGEN SATURATION: 98 % | WEIGHT: 105 LBS

## 2018-05-15 DIAGNOSIS — M33.20 IDIOPATHIC POLYMYOSITIS: ICD-10-CM

## 2018-05-15 DIAGNOSIS — D50.9 IRON DEFICIENCY ANEMIA, UNSPECIFIED IRON DEFICIENCY ANEMIA TYPE: Primary | ICD-10-CM

## 2018-05-15 DIAGNOSIS — D83.9 CVID (COMMON VARIABLE IMMUNODEFICIENCY): ICD-10-CM

## 2018-05-15 DIAGNOSIS — M32.9 SYSTEMIC LUPUS ERYTHEMATOSUS, UNSPECIFIED SLE TYPE, UNSPECIFIED ORGAN INVOLVEMENT STATUS: ICD-10-CM

## 2018-05-15 DIAGNOSIS — M06.9 RHEUMATOID ARTHRITIS, INVOLVING UNSPECIFIED SITE, UNSPECIFIED RHEUMATOID FACTOR PRESENCE: ICD-10-CM

## 2018-05-15 DIAGNOSIS — R77.8 ABNORMAL SPEP: ICD-10-CM

## 2018-05-15 DIAGNOSIS — Z79.899 LONG TERM CURRENT USE OF IMMUNOSUPPRESSIVE DRUG: ICD-10-CM

## 2018-05-15 LAB
BASOPHILS # BLD AUTO: 0.02 K/UL
BASOPHILS NFR BLD: 0.2 %
C3 SERPL-MCNC: 133 MG/DL
C4 SERPL-MCNC: 34 MG/DL
CRP SERPL-MCNC: 6.2 MG/DL
DIFFERENTIAL METHOD: ABNORMAL
EOSINOPHIL # BLD AUTO: 0.1 K/UL
EOSINOPHIL NFR BLD: 1.3 %
ERYTHROCYTE [DISTWIDTH] IN BLOOD BY AUTOMATED COUNT: 13.6 %
ERYTHROCYTE [SEDIMENTATION RATE] IN BLOOD: 104 MM/HR
HCT VFR BLD AUTO: 27.2 %
HGB BLD-MCNC: 8.3 G/DL
LYMPHOCYTES # BLD AUTO: 1.4 K/UL
LYMPHOCYTES NFR BLD: 14.2 %
MCH RBC QN AUTO: 25.5 PG
MCHC RBC AUTO-ENTMCNC: 30.5 G/DL
MCV RBC AUTO: 84 FL
MONOCYTES # BLD AUTO: 0.5 K/UL
MONOCYTES NFR BLD: 5.4 %
NEUTROPHILS # BLD AUTO: 7.9 K/UL
NEUTROPHILS NFR BLD: 78.9 %
NRBC BLD-RTO: 0 /100 WBC
PLATELET # BLD AUTO: 385 K/UL
PMV BLD AUTO: 9.4 FL
RBC # BLD AUTO: 3.25 M/UL
T3FREE SP1 P CHAL SERPL-SCNC: 7.76 PG/ML
TSH SERPL DL<=0.005 MIU/L-ACNC: <0.015 UIU/ML
WBC # BLD AUTO: 9.96 K/UL

## 2018-05-15 PROCEDURE — 86140 C-REACTIVE PROTEIN: CPT

## 2018-05-15 PROCEDURE — 86140 C-REACTIVE PROTEIN: CPT | Mod: PN

## 2018-05-15 PROCEDURE — 63600175 PHARM REV CODE 636 W HCPCS: Mod: PN | Performed by: INTERNAL MEDICINE

## 2018-05-15 PROCEDURE — 86235 NUCLEAR ANTIGEN ANTIBODY: CPT

## 2018-05-15 PROCEDURE — 84481 FREE ASSAY (FT-3): CPT

## 2018-05-15 PROCEDURE — 84443 ASSAY THYROID STIM HORMONE: CPT

## 2018-05-15 PROCEDURE — 86235 NUCLEAR ANTIGEN ANTIBODY: CPT | Mod: 59

## 2018-05-15 PROCEDURE — 25000003 PHARM REV CODE 250: Mod: PN | Performed by: INTERNAL MEDICINE

## 2018-05-15 PROCEDURE — 86160 COMPLEMENT ANTIGEN: CPT | Mod: 59,PN

## 2018-05-15 PROCEDURE — 86160 COMPLEMENT ANTIGEN: CPT

## 2018-05-15 PROCEDURE — 85025 COMPLETE CBC W/AUTO DIFF WBC: CPT

## 2018-05-15 PROCEDURE — 85652 RBC SED RATE AUTOMATED: CPT | Mod: PN

## 2018-05-15 PROCEDURE — 84481 FREE ASSAY (FT-3): CPT | Mod: PN

## 2018-05-15 PROCEDURE — 96375 TX/PRO/DX INJ NEW DRUG ADDON: CPT | Mod: PN

## 2018-05-15 PROCEDURE — 83516 IMMUNOASSAY NONANTIBODY: CPT

## 2018-05-15 PROCEDURE — 84443 ASSAY THYROID STIM HORMONE: CPT | Mod: PN

## 2018-05-15 PROCEDURE — 86038 ANTINUCLEAR ANTIBODIES: CPT

## 2018-05-15 PROCEDURE — 85652 RBC SED RATE AUTOMATED: CPT

## 2018-05-15 PROCEDURE — 86225 DNA ANTIBODY NATIVE: CPT

## 2018-05-15 PROCEDURE — A4216 STERILE WATER/SALINE, 10 ML: HCPCS | Mod: PN | Performed by: INTERNAL MEDICINE

## 2018-05-15 PROCEDURE — 86039 ANTINUCLEAR ANTIBODIES (ANA): CPT

## 2018-05-15 PROCEDURE — 85613 RUSSELL VIPER VENOM DILUTED: CPT

## 2018-05-15 PROCEDURE — 86160 COMPLEMENT ANTIGEN: CPT | Mod: PN

## 2018-05-15 PROCEDURE — 86160 COMPLEMENT ANTIGEN: CPT | Mod: 59

## 2018-05-15 PROCEDURE — 36415 COLL VENOUS BLD VENIPUNCTURE: CPT | Mod: PN

## 2018-05-15 PROCEDURE — 96413 CHEMO IV INFUSION 1 HR: CPT | Mod: PN

## 2018-05-15 PROCEDURE — 85598 HEXAGNAL PHOSPH PLTLT NEUTRL: CPT

## 2018-05-15 PROCEDURE — 85025 COMPLETE CBC W/AUTO DIFF WBC: CPT | Mod: PN

## 2018-05-15 RX ORDER — SODIUM CHLORIDE 0.9 % (FLUSH) 0.9 %
10 SYRINGE (ML) INJECTION
Status: DISCONTINUED | OUTPATIENT
Start: 2018-05-15 | End: 2018-05-15 | Stop reason: HOSPADM

## 2018-05-15 RX ORDER — HEPARIN 100 UNIT/ML
500 SYRINGE INTRAVENOUS
Status: CANCELLED | OUTPATIENT
Start: 2018-05-15

## 2018-05-15 RX ORDER — ACETAMINOPHEN 325 MG/1
650 TABLET ORAL
Status: CANCELLED | OUTPATIENT
Start: 2018-05-15

## 2018-05-15 RX ORDER — SODIUM CHLORIDE 0.9 % (FLUSH) 0.9 %
10 SYRINGE (ML) INJECTION
Status: CANCELLED | OUTPATIENT
Start: 2018-05-15

## 2018-05-15 RX ORDER — DIPHENHYDRAMINE HYDROCHLORIDE 50 MG/ML
25 INJECTION INTRAMUSCULAR; INTRAVENOUS
Status: COMPLETED | OUTPATIENT
Start: 2018-05-15 | End: 2018-05-15

## 2018-05-15 RX ORDER — ACETAMINOPHEN 325 MG/1
650 TABLET ORAL
Status: COMPLETED | OUTPATIENT
Start: 2018-05-15 | End: 2018-05-15

## 2018-05-15 RX ADMIN — ACETAMINOPHEN 650 MG: 325 TABLET, FILM COATED ORAL at 03:05

## 2018-05-15 RX ADMIN — BELIMUMAB 476 MG: 400 INJECTION, POWDER, LYOPHILIZED, FOR SOLUTION INTRAVENOUS at 03:05

## 2018-05-15 RX ADMIN — SODIUM CHLORIDE, PRESERVATIVE FREE 10 ML: 5 INJECTION INTRAVENOUS at 03:05

## 2018-05-15 RX ADMIN — SODIUM CHLORIDE: 9 INJECTION, SOLUTION INTRAVENOUS at 03:05

## 2018-05-15 RX ADMIN — DIPHENHYDRAMINE HYDROCHLORIDE 25 MG: 50 INJECTION INTRAMUSCULAR; INTRAVENOUS at 03:05

## 2018-05-15 NOTE — PLAN OF CARE
Problem: Patient Care Overview  Goal: Plan of Care Review  Outcome: Ongoing (interventions implemented as appropriate)  Tolerated treatment well, VSS, schedule reviewed with pt, left infusion center per wheelchair with mom

## 2018-05-17 ENCOUNTER — TELEPHONE (OUTPATIENT)
Dept: RHEUMATOLOGY | Facility: CLINIC | Age: 43
End: 2018-05-17

## 2018-05-17 LAB
ANA SER QL IF: POSITIVE
ANA TITR SER IF: NORMAL {TITER}
ANTI SM ANTIBODY: 0.55 EU
ANTI SM/RNP ANTIBODY: 0.9 EU
ANTI-SM INTERPRETATION: NEGATIVE
ANTI-SM/RNP INTERPRETATION: NEGATIVE
ANTI-SSA ANTIBODY: 1.36 EU
ANTI-SSA INTERPRETATION: NEGATIVE
ANTI-SSB ANTIBODY: 0 EU
ANTI-SSB INTERPRETATION: NEGATIVE
DSDNA AB SER-ACNC: NORMAL [IU]/ML

## 2018-05-17 NOTE — TELEPHONE ENCOUNTER
----- Message from Jeremy Bell sent at 5/17/2018 10:47 AM CDT -----  Contact: Betty Aguilera, [phone]. Calling to get the direct office location to administer subpoena for Dr. Montaño to appear in court on 6/28. Please advise. Thanks.

## 2018-05-18 LAB — HISTONE IGG SER IA-ACNC: 0.3 UNITS (ref 0–0.9)

## 2018-05-21 ENCOUNTER — PATIENT MESSAGE (OUTPATIENT)
Dept: RHEUMATOLOGY | Facility: CLINIC | Age: 43
End: 2018-05-21

## 2018-05-21 ENCOUNTER — PATIENT MESSAGE (OUTPATIENT)
Dept: HEMATOLOGY/ONCOLOGY | Facility: CLINIC | Age: 43
End: 2018-05-21

## 2018-05-21 LAB — APTT HEX PL PPP: POSITIVE S

## 2018-05-22 ENCOUNTER — HOSPITAL ENCOUNTER (OUTPATIENT)
Dept: RADIOLOGY | Facility: HOSPITAL | Age: 43
Discharge: HOME | End: 2018-05-22
Attending: INTERNAL MEDICINE
Payer: MEDICARE

## 2018-05-22 DIAGNOSIS — M06.9 RHEUMATOID ARTHRITIS OF HAND, UNSPECIFIED LATERALITY, UNSPECIFIED RHEUMATOID FACTOR PRESENCE: ICD-10-CM

## 2018-05-22 DIAGNOSIS — R59.1 LYMPHADENOPATHY: ICD-10-CM

## 2018-05-22 LAB — LA PPP-IMP: POSITIVE

## 2018-05-22 PROCEDURE — 78815 PET IMAGE W/CT SKULL-THIGH: CPT | Mod: 26,PI,, | Performed by: RADIOLOGY

## 2018-05-22 PROCEDURE — A9552 F18 FDG: HCPCS | Mod: PO

## 2018-05-22 PROCEDURE — 78815 PET IMAGE W/CT SKULL-THIGH: CPT | Mod: TC,PO

## 2018-06-05 ENCOUNTER — OFFICE VISIT (OUTPATIENT)
Dept: RHEUMATOLOGY | Facility: CLINIC | Age: 43
End: 2018-06-05
Payer: MEDICARE

## 2018-06-05 VITALS
WEIGHT: 110 LBS | HEIGHT: 60 IN | DIASTOLIC BLOOD PRESSURE: 84 MMHG | HEART RATE: 94 BPM | SYSTOLIC BLOOD PRESSURE: 123 MMHG | BODY MASS INDEX: 21.6 KG/M2

## 2018-06-05 DIAGNOSIS — D64.9 ANEMIA, UNSPECIFIED TYPE: ICD-10-CM

## 2018-06-05 DIAGNOSIS — M06.9 RHEUMATOID ARTHRITIS, INVOLVING UNSPECIFIED SITE, UNSPECIFIED RHEUMATOID FACTOR PRESENCE: Primary | ICD-10-CM

## 2018-06-05 DIAGNOSIS — M45.9 ANKYLOSING SPONDYLITIS, UNSPECIFIED SITE OF SPINE: ICD-10-CM

## 2018-06-05 DIAGNOSIS — R77.8 ABNORMAL SPEP: ICD-10-CM

## 2018-06-05 DIAGNOSIS — M32.9 SYSTEMIC LUPUS ERYTHEMATOSUS, UNSPECIFIED SLE TYPE, UNSPECIFIED ORGAN INVOLVEMENT STATUS: ICD-10-CM

## 2018-06-05 DIAGNOSIS — Z79.899 LONG TERM CURRENT USE OF IMMUNOSUPPRESSIVE DRUG: ICD-10-CM

## 2018-06-05 PROCEDURE — 99999 PR PBB SHADOW E&M-EST. PATIENT-LVL V: CPT | Mod: PBBFAC,,, | Performed by: INTERNAL MEDICINE

## 2018-06-05 PROCEDURE — 99214 OFFICE O/P EST MOD 30 MIN: CPT | Mod: S$PBB,,, | Performed by: INTERNAL MEDICINE

## 2018-06-05 PROCEDURE — 99215 OFFICE O/P EST HI 40 MIN: CPT | Mod: PBBFAC,PO | Performed by: INTERNAL MEDICINE

## 2018-06-05 NOTE — PROGRESS NOTES
Subjective:       Patient ID: Holli Kurtz is a 42 y.o. female.    Chief Complaint: Follow-up     Follow up: Ra,sle on benlysta doing poorly she has hip contracture, elbow contracture,  myosititis, severe anemia,wasting away,she has noticable teeth shifting and bone growth. She has severe difficulty transferring from wheelchair to wheelchair with falls.  She is doing poorly. Patient complains of arthralgias and myalgias for which has been present for a few years. Pain is located in multiple joints, both shoulder(s), both elbow(s), both wrist(s), both MCP(s): 1st, 2nd, 3rd, 4th and 5th, both PIP(s): 1st, 2nd, 3rd, 4th and 5th, both DIP(s): 1st and 2nd, both hip(s), both knee(s) and both MTP(s): 1st, 2nd, 3rd, 4th and 5th, is described as aching, pulsating, shooting and throbbing, and is constant, moderate .  Her mouth has to dress, bath, feed  Her, she calls the fire department daily to move from her bed to her motorized wheelchair and to get and from her doctor appointment she has to rent a van and her mother or father has to drive.      Review of Systems   Constitutional: Positive for activity change. Negative for appetite change, chills, diaphoresis and unexpected weight change.   HENT: Negative for congestion, dental problem, ear discharge, ear pain, facial swelling, mouth sores, nosebleeds, postnasal drip, rhinorrhea, sinus pressure, sneezing, sore throat, tinnitus and voice change.    Eyes: Negative for photophobia, pain, discharge, redness and itching.   Respiratory: Negative for apnea, cough, chest tightness, shortness of breath and wheezing.    Cardiovascular: Positive for leg swelling. Negative for chest pain and palpitations.   Gastrointestinal: Negative for abdominal distention, abdominal pain, constipation, diarrhea, nausea and vomiting.   Endocrine: Negative for cold intolerance, heat intolerance, polydipsia and polyuria.   Genitourinary: Negative for decreased urine volume, difficulty  urinating, flank pain, frequency, hematuria and urgency.   Musculoskeletal: Positive for arthralgias, back pain, gait problem, joint swelling, myalgias, neck pain and neck stiffness.   Skin: Negative for pallor, rash and wound.   Allergic/Immunologic: Negative for immunocompromised state.   Neurological: Negative for dizziness, tremors, weakness and numbness.   Hematological: Negative for adenopathy. Does not bruise/bleed easily.   Psychiatric/Behavioral: Negative for sleep disturbance. The patient is not nervous/anxious.          Objective:     /84 (BP Location: Right arm, Patient Position: Sitting, BP Method: Medium (Automatic))   Pulse 94   Ht 5' (1.524 m)   Wt 49.9 kg (110 lb)   BMI 21.48 kg/m²      Physical Exam   Nursing note and vitals reviewed.  Constitutional: She is oriented to person, place, and time. She appears distressed.   HENT:   Head: Normocephalic and atraumatic.   Right Ear: External ear normal.   Mouth/Throat: Oropharynx is clear and moist.   Eyes: EOM are normal. Pupils are equal, round, and reactive to light.   Neck: Neck supple. No thyromegaly present.   Cardiovascular: Normal rate, regular rhythm and normal heart sounds.  Exam reveals no gallop and no friction rub.    No murmur heard.  Pulmonary/Chest: Breath sounds normal. She has no wheezes. She has no rales. She exhibits no tenderness.   Abdominal: There is no tenderness. There is no rebound and no guarding.       Right Side Rheumatological Exam     Examination finds the elbow, 1st MCP, 2nd MCP, 3rd MCP, 4th MCP and 5th MCP normal.    The patient is tender to palpation of the shoulder, elbow, wrist, knee, 1st PIP, 1st MCP, 2nd PIP, 2nd MCP, 3rd PIP, 3rd MCP, 4th PIP, 4th MCP, 5th PIP and 5th MCP    She has swelling of the knee, 1st PIP, 1st MCP, 2nd PIP, 2nd MCP, 3rd PIP, 3rd MCP, 4th PIP, 4th MCP, 5th PIP and 5th MCP    The patient has an enlarged wrist, 1st PIP, 2nd PIP, 3rd PIP, 4th PIP and 5th PIP    Shoulder Exam    Tenderness Location: acromion    Range of Motion   Active Abduction: abnormal   Adduction: abnormal  Sensation: normal    Knee Exam   Tenderness Location: medial joint line  Patellofemoral Crepitus: positive  Effusion: positive  Sensation: normal    Hip Exam   Tenderness Location: no tenderness  Sensation: normal    Elbow/Wrist Exam   Tenderness Location: no tenderness  Sensation: normal    Foot Exam   Right foot exam exhibits signs of inflamed dorsum  Right foot exam exhibits signs of no podagra, no tophus and no plantar fasciitis    Muscle Strength (0-5 scale):  : 4/5     Left Side Rheumatological Exam     Examination finds the elbow, knee, 1st MCP, 2nd MCP, 3rd MCP, 4th MCP and 5th MCP normal.    The patient is tender to palpation of the shoulder, elbow, wrist, knee, 1st PIP, 1st MCP, 2nd PIP, 2nd MCP, 3rd PIP, 3rd MCP, 4th PIP, 4th MCP, 5th PIP and 5th MCP.    She has swelling of the 1st PIP, 1st MCP, 2nd PIP, 2nd MCP, 3rd PIP, 3rd MCP, 4th PIP, 4th MCP, 5th PIP and 5th MCP    The patient has an enlarged wrist, 1st PIP, 2nd PIP, 3rd PIP, 4th PIP and 5th PIP.    Shoulder Exam   Tenderness Location: acromion    Range of Motion   Active Abduction: abnormal   Sensation: normal    Knee Exam   Tenderness Location: lateral joint line and medial joint line    Patellofemoral Crepitus: positive  Effusion: positive  Sensation: normal    Hip Exam   Tenderness Location: no tenderness  Sensation: normal    Elbow/Wrist Exam   Tenderness Location: lateral epicondyle and medial epicondyle  Sensation: normal    Foot Exam   Left foot exam exhibits signs of inflamed dorsum  Left foot exam exhibits signs of no podagra and no plantar fasciitis    Muscle Strength (0-5 scale):  :  4/5       Back/Neck Exam   General Inspection   Gait: normal       Tenderness Right paramedian tenderness of the Lower C-Spine and Lower L-Spine.Left paramedian tenderness of the Upper C-Spine and Lower L-Spine.      Comments:  15 out of 18 tender  points    Lymphadenopathy:     She has no cervical adenopathy.   Neurological: She is alert and oriented to person, place, and time.   Reflex Scores:       Patellar reflexes are 3+ on the right side and 3+ on the left side.  motorized wheelchair is her mode of transport   Skin: No rash noted. No erythema. No pallor.     Psychiatric: Mood and affect normal.   Musculoskeletal: She exhibits edema, tenderness and deformity.   ra changes on hands pips and B wrist are fusing                                          Assessment:         Encounter Diagnoses   Name Primary?    Rheumatoid arthritis, involving unspecified site, unspecified rheumatoid factor presence Yes    Abnormal SPEP     Systemic lupus erythematosus, unspecified SLE type, unspecified organ involvement status     Ankylosing spondylitis, unspecified site of spine     Anemia, unspecified type     Long term current use of immunosuppressive drug          Plan:   Rheumatoid arthritis, involving unspecified site, unspecified rheumatoid factor presence  -     Aldolase; Future; Expected date: 06/05/2018  -     CBC auto differential; Future; Expected date: 06/05/2018  -     Comprehensive metabolic panel; Future; Expected date: 06/05/2018  -     Iron and TIBC; Future; Expected date: 06/05/2018  -     C-reactive protein; Future; Expected date: 06/05/2018  -     Rheumatoid factor; Future; Expected date: 06/05/2018  -     Sedimentation rate; Future; Expected date: 06/05/2018  -     Cyclic citrul peptide antibody, IgG; Future; Expected date: 06/05/2018  -     CK; Future; Expected date: 06/05/2018    Abnormal SPEP  -     Aldolase; Future; Expected date: 06/05/2018  -     CBC auto differential; Future; Expected date: 06/05/2018  -     Comprehensive metabolic panel; Future; Expected date: 06/05/2018  -     Iron and TIBC; Future; Expected date: 06/05/2018  -     C-reactive protein; Future; Expected date: 06/05/2018  -     Rheumatoid factor; Future; Expected date:  06/05/2018  -     Sedimentation rate; Future; Expected date: 06/05/2018  -     Cyclic citrul peptide antibody, IgG; Future; Expected date: 06/05/2018  -     CK; Future; Expected date: 06/05/2018    Systemic lupus erythematosus, unspecified SLE type, unspecified organ involvement status  -     Aldolase; Future; Expected date: 06/05/2018  -     CBC auto differential; Future; Expected date: 06/05/2018  -     Comprehensive metabolic panel; Future; Expected date: 06/05/2018  -     Iron and TIBC; Future; Expected date: 06/05/2018  -     C-reactive protein; Future; Expected date: 06/05/2018  -     Rheumatoid factor; Future; Expected date: 06/05/2018  -     Sedimentation rate; Future; Expected date: 06/05/2018  -     Cyclic citrul peptide antibody, IgG; Future; Expected date: 06/05/2018  -     CK; Future; Expected date: 06/05/2018    Ankylosing spondylitis, unspecified site of spine  -     Aldolase; Future; Expected date: 06/05/2018  -     CBC auto differential; Future; Expected date: 06/05/2018  -     Comprehensive metabolic panel; Future; Expected date: 06/05/2018  -     Iron and TIBC; Future; Expected date: 06/05/2018  -     C-reactive protein; Future; Expected date: 06/05/2018  -     Rheumatoid factor; Future; Expected date: 06/05/2018  -     Sedimentation rate; Future; Expected date: 06/05/2018  -     Cyclic citrul peptide antibody, IgG; Future; Expected date: 06/05/2018  -     CK; Future; Expected date: 06/05/2018    Anemia, unspecified type  -     Aldolase; Future; Expected date: 06/05/2018  -     CBC auto differential; Future; Expected date: 06/05/2018  -     Comprehensive metabolic panel; Future; Expected date: 06/05/2018  -     Iron and TIBC; Future; Expected date: 06/05/2018  -     C-reactive protein; Future; Expected date: 06/05/2018  -     Rheumatoid factor; Future; Expected date: 06/05/2018  -     Sedimentation rate; Future; Expected date: 06/05/2018  -     Cyclic citrul peptide antibody, IgG; Future; Expected  date: 06/05/2018  -     CK; Future; Expected date: 06/05/2018    Long term current use of immunosuppressive drug  -     Aldolase; Future; Expected date: 06/05/2018  -     CBC auto differential; Future; Expected date: 06/05/2018  -     Comprehensive metabolic panel; Future; Expected date: 06/05/2018  -     Iron and TIBC; Future; Expected date: 06/05/2018  -     C-reactive protein; Future; Expected date: 06/05/2018  -     Rheumatoid factor; Future; Expected date: 06/05/2018  -     Sedimentation rate; Future; Expected date: 06/05/2018  -     Cyclic citrul peptide antibody, IgG; Future; Expected date: 06/05/2018  -     CK; Future; Expected date: 06/05/2018    Other orders  -     anakinra 100 mg/0.67 mL Syrg; Inject 1 mL (149.2537 mg total) into the skin once daily.  Dispense: 30 Syringe; Refill: 11       Will get assistance from [facility] and consider. The patient required  24 hour assistance to help with ADLs. We will DC the benlysta as it has not slowed the progression of her inflammatory arthritis. considerthat she has tried and failed most of my more aggressive medication. We will start Kinerect asap

## 2018-06-06 ENCOUNTER — TELEPHONE (OUTPATIENT)
Dept: RHEUMATOLOGY | Facility: CLINIC | Age: 43
End: 2018-06-06

## 2018-06-06 NOTE — TELEPHONE ENCOUNTER
----- Message from Jeremy Bell sent at 6/6/2018  8:53 AM CDT -----  Contact: Danny with [facility]  Type:  Pharmacy Calling to Clarify an RX    Name of Caller:  Danny  Pharmacy Name:  [facility]  Prescription Name:  anakinra 100 mg/0.67 mL Syrg  What do they need to clarify?:  directions  Best Call Back Number:  [phone]  Additional Information:  Says script calls for 1mL injection and the seringe is on .67mL. Would like to make sure it's supposed to be 1mL

## 2018-06-06 NOTE — TELEPHONE ENCOUNTER
Please clarify dosage on anakinra. Script calls for 1ml injection and the syringe is .67ml. Please clarify if it is suppose to be 1ml

## 2018-06-11 ENCOUNTER — OFFICE VISIT (OUTPATIENT)
Dept: HEMATOLOGY/ONCOLOGY | Facility: CLINIC | Age: 43
End: 2018-06-11
Payer: MEDICARE

## 2018-06-11 ENCOUNTER — TELEPHONE (OUTPATIENT)
Dept: RHEUMATOLOGY | Facility: CLINIC | Age: 43
End: 2018-06-11

## 2018-06-11 VITALS
BODY MASS INDEX: 21.6 KG/M2 | SYSTOLIC BLOOD PRESSURE: 134 MMHG | HEIGHT: 60 IN | TEMPERATURE: 99 F | HEART RATE: 106 BPM | DIASTOLIC BLOOD PRESSURE: 89 MMHG | WEIGHT: 110 LBS | RESPIRATION RATE: 16 BRPM

## 2018-06-11 DIAGNOSIS — R59.1 LYMPHADENOPATHY: ICD-10-CM

## 2018-06-11 DIAGNOSIS — D89.0 POLYCLONAL GAMMOPATHY: ICD-10-CM

## 2018-06-11 DIAGNOSIS — M06.9 RHEUMATOID ARTHRITIS OF HAND, UNSPECIFIED LATERALITY, UNSPECIFIED RHEUMATOID FACTOR PRESENCE: Primary | ICD-10-CM

## 2018-06-11 PROCEDURE — 99999 PR PBB SHADOW E&M-EST. PATIENT-LVL IV: CPT | Mod: PBBFAC,,, | Performed by: INTERNAL MEDICINE

## 2018-06-11 PROCEDURE — 99214 OFFICE O/P EST MOD 30 MIN: CPT | Mod: PBBFAC,PN | Performed by: INTERNAL MEDICINE

## 2018-06-11 PROCEDURE — 99214 OFFICE O/P EST MOD 30 MIN: CPT | Mod: S$PBB,,, | Performed by: INTERNAL MEDICINE

## 2018-06-11 NOTE — PROGRESS NOTES
The patient is a 42-year-old white female well known to me for polyclonal   gammopathy, severe rheumatoid arthritis, and painful lymph nodes, which wax and   wane in relation to timing of Benlysta infusion.  This is worrisome for   possibility of biologic therapy-induced lymphoproliferative disorder.  The   patient underwent CT/PET scan for evaluation of her major lymph node groups and   returns to clinic to review results.  No new complaints.  No new pertinent   findings on a 14-point review of systems.    PHYSICAL EXAMINATION:  Unchanged from that performed at the time of evaluation   on 04/17/2018.    CT PET scan is remarkable for some enlarged and nonenlarged poorly metabolic   right axillary lymph nodes.  There is focal hypermetabolism within the right   inferior oblique muscle adjacent to the right lamina of C1, which is thought to   represent muscular strain.    IMPRESSION:  1.  Polyclonal gammopathy due to severe rheumatoid arthritis.  2.  Waxing/waning lymphadenopathy temporally related to Benlysta infusions.    PLAN:  1.  We will consult Dr. Mckinnon for evaluation and potential lymph node biopsy.    Ideally, this would best be performed shortly after one of the patient's   Benlysta infusions when she begins to experience enlargement and pain in   association with involved nodes.  2.  Should the patient undergo biopsy, I would like to see her back in clinic in   order to review results of surgical pathology.  Otherwise, she may follow on an   as needed basis.      FELISHA/RAHEL  dd: 06/11/2018 14:04:13 (CDT)  td: 06/12/2018 08:25:31 (CDT)  Doc ID   #[id number]  Job ID #[id number]    CC:

## 2018-06-11 NOTE — TELEPHONE ENCOUNTER
----- Message from Rima Donahue sent at 6/11/2018  9:37 AM CDT -----  Contact: [facility]  [facility] calling needing direction on pt anakinra 100 mg/0.67 mL Syrg,please..[phone](ask for her)

## 2018-06-15 NOTE — TELEPHONE ENCOUNTER
DOCUMENTATION ONLY:  Prior authorization for Kineret approved from 6/15/18 to 12/31/18    Co-pay: $0

## 2018-06-21 ENCOUNTER — PATIENT MESSAGE (OUTPATIENT)
Dept: RHEUMATOLOGY | Facility: CLINIC | Age: 43
End: 2018-06-21

## 2018-07-10 ENCOUNTER — TELEPHONE (OUTPATIENT)
Dept: INFUSION THERAPY | Facility: HOSPITAL | Age: 43
End: 2018-07-10

## 2018-07-10 NOTE — TELEPHONE ENCOUNTER
----- Message from Fannie Castro sent at 7/10/2018 11:34 AM CDT -----  Caller:  Patient                 Callback number: [phone]                       Reason: Patient needs to schedule labs through [facility]                 Thank you

## 2018-07-10 NOTE — TELEPHONE ENCOUNTER
Spoke with patient she needs to schedule labs from [facility]. Labs are from dr hudson this was schedule

## 2018-07-12 ENCOUNTER — TELEPHONE (OUTPATIENT)
Dept: ADMINISTRATIVE | Facility: CLINIC | Age: 43
End: 2018-07-12

## 2018-07-12 NOTE — PATIENT INSTRUCTIONS
Home Health SOC with [facility]. Dr Karson Montaño. SN, PT, OT, HHA services.

## 2018-07-16 ENCOUNTER — INFUSION (OUTPATIENT)
Dept: INFUSION THERAPY | Facility: HOSPITAL | Age: 43
End: 2018-07-16
Attending: INTERNAL MEDICINE
Payer: MEDICARE

## 2018-07-16 DIAGNOSIS — M45.9 ANKYLOSING SPONDYLITIS, UNSPECIFIED SITE OF SPINE: ICD-10-CM

## 2018-07-16 DIAGNOSIS — D50.9 IRON DEFICIENCY ANEMIA, UNSPECIFIED IRON DEFICIENCY ANEMIA TYPE: ICD-10-CM

## 2018-07-16 DIAGNOSIS — M33.20 IDIOPATHIC POLYMYOSITIS: ICD-10-CM

## 2018-07-16 DIAGNOSIS — M32.9 SYSTEMIC LUPUS ERYTHEMATOSUS, UNSPECIFIED SLE TYPE, UNSPECIFIED ORGAN INVOLVEMENT STATUS: ICD-10-CM

## 2018-07-16 DIAGNOSIS — D64.9 ANEMIA, UNSPECIFIED TYPE: ICD-10-CM

## 2018-07-16 DIAGNOSIS — Z79.899 LONG TERM CURRENT USE OF IMMUNOSUPPRESSIVE DRUG: ICD-10-CM

## 2018-07-16 DIAGNOSIS — M06.9 RHEUMATOID ARTHRITIS, INVOLVING UNSPECIFIED SITE, UNSPECIFIED RHEUMATOID FACTOR PRESENCE: ICD-10-CM

## 2018-07-16 DIAGNOSIS — D83.9 CVID (COMMON VARIABLE IMMUNODEFICIENCY): ICD-10-CM

## 2018-07-16 DIAGNOSIS — R77.8 ABNORMAL SPEP: Primary | ICD-10-CM

## 2018-07-16 LAB
ALBUMIN SERPL BCP-MCNC: 3.8 G/DL
ALP SERPL-CCNC: 168 U/L
ALT SERPL W/O P-5'-P-CCNC: 39 U/L
ANION GAP SERPL CALC-SCNC: 8 MMOL/L
AST SERPL-CCNC: 44 U/L
BASOPHILS # BLD AUTO: 0.02 K/UL
BASOPHILS NFR BLD: 0.2 %
BILIRUB SERPL-MCNC: 0.2 MG/DL
BUN SERPL-MCNC: 18 MG/DL
CALCIUM SERPL-MCNC: 9.6 MG/DL
CCP AB SER IA-ACNC: 1.1 U/ML
CHLORIDE SERPL-SCNC: 105 MMOL/L
CK SERPL-CCNC: 21 U/L
CO2 SERPL-SCNC: 28 MMOL/L
CREAT SERPL-MCNC: 0.39 MG/DL
CRP SERPL-MCNC: 7.4 MG/DL
DIFFERENTIAL METHOD: ABNORMAL
EOSINOPHIL # BLD AUTO: 0.3 K/UL
EOSINOPHIL NFR BLD: 3.2 %
ERYTHROCYTE [DISTWIDTH] IN BLOOD BY AUTOMATED COUNT: 15.9 %
ERYTHROCYTE [SEDIMENTATION RATE] IN BLOOD: 91 MM/HR
EST. GFR  (AFRICAN AMERICAN): >60 ML/MIN/1.73 M^2
EST. GFR  (NON AFRICAN AMERICAN): >60 ML/MIN/1.73 M^2
GLUCOSE SERPL-MCNC: 96 MG/DL
HCT VFR BLD AUTO: 28.2 %
HGB BLD-MCNC: 8.4 G/DL
IRON SATN MFR SERPL: 8 %
IRON SERPL-MCNC: 18 UG/DL
LYMPHOCYTES # BLD AUTO: 1.3 K/UL
LYMPHOCYTES NFR BLD: 15.9 %
MCH RBC QN AUTO: 25.4 PG
MCHC RBC AUTO-ENTMCNC: 29.8 G/DL
MCV RBC AUTO: 85 FL
MONOCYTES # BLD AUTO: 0.7 K/UL
MONOCYTES NFR BLD: 8.5 %
NEUTROPHILS # BLD AUTO: 5.9 K/UL
NEUTROPHILS NFR BLD: 72.2 %
NRBC BLD-RTO: 0 /100 WBC
PLATELET # BLD AUTO: 376 K/UL
PMV BLD AUTO: 9.3 FL
POTASSIUM SERPL-SCNC: 4.3 MMOL/L
PROT SERPL-MCNC: 8.3 G/DL
RBC # BLD AUTO: 3.31 M/UL
RHEUMATOID FACT SERPL-ACNC: <8.6 IU/ML
SODIUM SERPL-SCNC: 141 MMOL/L
TOTAL IRON BINDING CAPACITY: 212 UG/DL
WBC # BLD AUTO: 8.16 K/UL

## 2018-07-16 PROCEDURE — 85652 RBC SED RATE AUTOMATED: CPT | Mod: PN

## 2018-07-16 PROCEDURE — 82550 ASSAY OF CK (CPK): CPT | Mod: PN

## 2018-07-16 PROCEDURE — 86140 C-REACTIVE PROTEIN: CPT | Mod: PN

## 2018-07-16 PROCEDURE — 25000003 PHARM REV CODE 250: Mod: PN | Performed by: INTERNAL MEDICINE

## 2018-07-16 PROCEDURE — 36591 DRAW BLOOD OFF VENOUS DEVICE: CPT | Mod: PN

## 2018-07-16 PROCEDURE — 80053 COMPREHEN METABOLIC PANEL: CPT | Mod: PN

## 2018-07-16 PROCEDURE — A4216 STERILE WATER/SALINE, 10 ML: HCPCS | Mod: PN | Performed by: INTERNAL MEDICINE

## 2018-07-16 PROCEDURE — 82085 ASSAY OF ALDOLASE: CPT

## 2018-07-16 PROCEDURE — 85025 COMPLETE CBC W/AUTO DIFF WBC: CPT | Mod: PN

## 2018-07-16 PROCEDURE — 83540 ASSAY OF IRON: CPT | Mod: PN

## 2018-07-16 PROCEDURE — 82550 ASSAY OF CK (CPK): CPT

## 2018-07-16 PROCEDURE — 85652 RBC SED RATE AUTOMATED: CPT

## 2018-07-16 PROCEDURE — 83540 ASSAY OF IRON: CPT

## 2018-07-16 PROCEDURE — 86431 RHEUMATOID FACTOR QUANT: CPT

## 2018-07-16 PROCEDURE — 80053 COMPREHEN METABOLIC PANEL: CPT

## 2018-07-16 PROCEDURE — 86200 CCP ANTIBODY: CPT

## 2018-07-16 PROCEDURE — 86431 RHEUMATOID FACTOR QUANT: CPT | Mod: PN

## 2018-07-16 PROCEDURE — 85025 COMPLETE CBC W/AUTO DIFF WBC: CPT

## 2018-07-16 PROCEDURE — 86140 C-REACTIVE PROTEIN: CPT

## 2018-07-16 RX ORDER — ACETAMINOPHEN 325 MG/1
650 TABLET ORAL
Status: CANCELLED | OUTPATIENT
Start: 2018-07-16

## 2018-07-16 RX ORDER — SODIUM CHLORIDE 0.9 % (FLUSH) 0.9 %
10 SYRINGE (ML) INJECTION
Status: DISCONTINUED | OUTPATIENT
Start: 2018-07-16 | End: 2018-07-16 | Stop reason: HOSPADM

## 2018-07-16 RX ORDER — HEPARIN 100 UNIT/ML
500 SYRINGE INTRAVENOUS
Status: CANCELLED | OUTPATIENT
Start: 2018-07-16

## 2018-07-16 RX ORDER — SODIUM CHLORIDE 0.9 % (FLUSH) 0.9 %
10 SYRINGE (ML) INJECTION
Status: CANCELLED | OUTPATIENT
Start: 2018-07-16

## 2018-07-16 RX ADMIN — SODIUM CHLORIDE, PRESERVATIVE FREE 10 ML: 5 INJECTION INTRAVENOUS at 11:07

## 2018-07-18 LAB — ALDOLASE SERPL-CCNC: 4.6 U/L

## 2018-07-19 ENCOUNTER — TELEPHONE (OUTPATIENT)
Dept: RHEUMATOLOGY | Facility: CLINIC | Age: 43
End: 2018-07-19

## 2018-07-19 NOTE — TELEPHONE ENCOUNTER
Pt stating that since starting new medication her face has started to swell, instead of her feet. Her joints are getting worse and she is even having trouble on the right side now. She is unable to use her left hand due to pain and joint deformity, having trouble lifting her right arm. Even having trouble straightening her elbows. Please advise.

## 2018-07-26 NOTE — TELEPHONE ENCOUNTER
Please call and check on this Holli make sure she is not have an allergic reaction to the Kineret at if she is significantly worse on kineret we will stop the medication

## 2018-07-26 NOTE — TELEPHONE ENCOUNTER
Incoming call from pt, feet no longer swelling. Still have facial swelling. Index Finger right hand right elbow is completely unusable. Feels like has flu after she takes everytime. Pt inquires how long to feel like medication is helping. Advised would discuss further with Dr. Montaño.

## 2018-07-27 ENCOUNTER — TELEPHONE (OUTPATIENT)
Dept: SURGERY | Facility: CLINIC | Age: 43
End: 2018-07-27

## 2018-07-27 DIAGNOSIS — R59.1 LYMPHADENOPATHY: Primary | ICD-10-CM

## 2018-07-27 NOTE — TELEPHONE ENCOUNTER
----- Message from Kaia Burleson sent at 7/27/2018  4:41 PM CDT -----  Contact: patient  Patient calling to schedule her appt for a later time. She says that she is in a wheelchair and 9:45 is too early in the morning. Please call [phone]

## 2018-07-27 NOTE — TELEPHONE ENCOUNTER
----- Message from Oksana Latif sent at 7/27/2018 10:48 AM CDT -----  Call pt for carmita for lymph node bx

## 2018-08-27 ENCOUNTER — PATIENT MESSAGE (OUTPATIENT)
Dept: RHEUMATOLOGY | Facility: CLINIC | Age: 43
End: 2018-08-27

## 2018-09-05 ENCOUNTER — TELEPHONE (OUTPATIENT)
Dept: RHEUMATOLOGY | Facility: CLINIC | Age: 43
End: 2018-09-05

## 2018-09-05 ENCOUNTER — OFFICE VISIT (OUTPATIENT)
Dept: RHEUMATOLOGY | Facility: CLINIC | Age: 43
End: 2018-09-05
Payer: MEDICARE

## 2018-09-05 VITALS
HEIGHT: 60 IN | BODY MASS INDEX: 21.6 KG/M2 | WEIGHT: 110 LBS | HEART RATE: 109 BPM | DIASTOLIC BLOOD PRESSURE: 91 MMHG | SYSTOLIC BLOOD PRESSURE: 133 MMHG

## 2018-09-05 DIAGNOSIS — M45.9 ANKYLOSING SPONDYLITIS, UNSPECIFIED SITE OF SPINE: Primary | ICD-10-CM

## 2018-09-05 DIAGNOSIS — R79.0 ABNORMAL LEVEL OF BLOOD MINERAL: ICD-10-CM

## 2018-09-05 DIAGNOSIS — L40.50 PSORIATIC ARTHRITIS: ICD-10-CM

## 2018-09-05 DIAGNOSIS — R71.8 OTHER ABNORMALITY OF RED BLOOD CELLS: ICD-10-CM

## 2018-09-05 DIAGNOSIS — Z79.899 LONG TERM CURRENT USE OF IMMUNOSUPPRESSIVE DRUG: ICD-10-CM

## 2018-09-05 DIAGNOSIS — M06.9 RHEUMATOID ARTHRITIS, INVOLVING UNSPECIFIED SITE, UNSPECIFIED RHEUMATOID FACTOR PRESENCE: ICD-10-CM

## 2018-09-05 DIAGNOSIS — I73.00 RAYNAUD'S PHENOMENON WITHOUT GANGRENE: ICD-10-CM

## 2018-09-05 PROCEDURE — 99999 PR PBB SHADOW E&M-EST. PATIENT-LVL IV: CPT | Mod: PBBFAC,,, | Performed by: INTERNAL MEDICINE

## 2018-09-05 PROCEDURE — 99214 OFFICE O/P EST MOD 30 MIN: CPT | Mod: PBBFAC,PO | Performed by: INTERNAL MEDICINE

## 2018-09-05 PROCEDURE — 99215 OFFICE O/P EST HI 40 MIN: CPT | Mod: S$PBB,,, | Performed by: INTERNAL MEDICINE

## 2018-09-05 RX ORDER — ONDANSETRON 2 MG/ML
8 INJECTION INTRAMUSCULAR; INTRAVENOUS ONCE
Status: CANCELLED
Start: 2018-09-05 | End: 2018-09-05

## 2018-09-05 RX ORDER — IPRATROPIUM BROMIDE AND ALBUTEROL SULFATE 2.5; .5 MG/3ML; MG/3ML
3 SOLUTION RESPIRATORY (INHALATION)
Status: CANCELLED | OUTPATIENT
Start: 2018-09-05

## 2018-09-05 RX ORDER — HEPARIN 100 UNIT/ML
500 SYRINGE INTRAVENOUS
Status: CANCELLED | OUTPATIENT
Start: 2018-09-05

## 2018-09-05 RX ORDER — DIPHENHYDRAMINE HYDROCHLORIDE 50 MG/ML
25 INJECTION INTRAMUSCULAR; INTRAVENOUS
Status: CANCELLED | OUTPATIENT
Start: 2018-09-05

## 2018-09-05 RX ORDER — ACETAMINOPHEN 325 MG/1
650 TABLET ORAL
Status: CANCELLED | OUTPATIENT
Start: 2018-09-05

## 2018-09-05 RX ORDER — SODIUM CHLORIDE 0.9 % (FLUSH) 0.9 %
10 SYRINGE (ML) INJECTION
Status: CANCELLED | OUTPATIENT
Start: 2018-09-05

## 2018-09-05 RX ORDER — METHYLPREDNISOLONE SOD SUCC 125 MG
40 VIAL (EA) INJECTION
Status: CANCELLED | OUTPATIENT
Start: 2018-09-05

## 2018-09-05 RX ORDER — EPINEPHRINE 1 MG/ML
0.3 INJECTION, SOLUTION, CONCENTRATE INTRAVENOUS
Status: CANCELLED | OUTPATIENT
Start: 2018-09-05

## 2018-09-05 ASSESSMENT — ROUTINE ASSESSMENT OF PATIENT INDEX DATA (RAPID3)
MDHAQ FUNCTION SCORE: 3
PATIENT GLOBAL ASSESSMENT SCORE: 9
PSYCHOLOGICAL DISTRESS SCORE: 6.6
TOTAL RAPID3 SCORE: 9.66
PAIN SCORE: 10

## 2018-09-05 NOTE — PROGRESS NOTES
Subjective:       Patient ID: Holli Kurtz is a 42 y.o. female.    Chief Complaint: Follow-up     Follow up: Ra,AS, sle  On kinerect, tried  Multiple  medication doing poorly she has hip contracture, elbow contracture,  myosititis, severe anemia,wasting away,she has noticable teeth shifting and bone growth. She has severe difficulty transferring from wheelchair to wheelchair with falls.  She is doing poorly. Patient complains of arthralgias and myalgias for which has been present for a few years. Pain is located in multiple joints, both shoulder(s), both elbow(s), both wrist(s), both MCP(s): 1st, 2nd, 3rd, 4th and 5th, both PIP(s): 1st, 2nd, 3rd, 4th and 5th, both DIP(s): 1st and 2nd, both hip(s), both knee(s) and both MTP(s): 1st, 2nd, 3rd, 4th and 5th, is described as aching, pulsating, shooting and throbbing, and is constant,severe debilitating .        Review of Systems   Constitutional: Positive for activity change. Negative for appetite change, chills, diaphoresis and unexpected weight change.   HENT: Negative for congestion, dental problem, ear discharge, ear pain, facial swelling, mouth sores, nosebleeds, postnasal drip, rhinorrhea, sinus pressure, sneezing, sore throat, tinnitus and voice change.    Eyes: Negative for photophobia, pain, discharge, redness and itching.   Respiratory: Negative for apnea, cough, chest tightness, shortness of breath and wheezing.    Cardiovascular: Positive for leg swelling. Negative for chest pain and palpitations.   Gastrointestinal: Negative for abdominal distention, abdominal pain, constipation, diarrhea, nausea and vomiting.   Endocrine: Negative for cold intolerance, heat intolerance, polydipsia and polyuria.   Genitourinary: Negative for decreased urine volume, difficulty urinating, flank pain, frequency, hematuria and urgency.   Musculoskeletal: Positive for arthralgias, back pain, gait problem, joint swelling, myalgias, neck pain and neck stiffness.   Skin:  Negative for pallor, rash and wound.   Allergic/Immunologic: Negative for immunocompromised state.   Neurological: Negative for dizziness, tremors, weakness and numbness.   Hematological: Negative for adenopathy. Does not bruise/bleed easily.   Psychiatric/Behavioral: Negative for sleep disturbance. The patient is not nervous/anxious.          Objective:     BP (!) 133/91 (BP Location: Right arm, Patient Position: Sitting, BP Method: Medium (Automatic))   Pulse 109   Ht 5' (1.524 m)   Wt 49.9 kg (110 lb)   BMI 21.48 kg/m²      Physical Exam   Nursing note and vitals reviewed.  Constitutional: She is oriented to person, place, and time. She appears distressed.   HENT:   Head: Normocephalic and atraumatic.   Right Ear: External ear normal.   Mouth/Throat: Oropharynx is clear and moist.   Eyes: EOM are normal. Pupils are equal, round, and reactive to light.   Neck: Neck supple. No thyromegaly present.   Cardiovascular: Normal rate, regular rhythm and normal heart sounds.  Exam reveals no gallop and no friction rub.    No murmur heard.  Pulmonary/Chest: Breath sounds normal. She has no wheezes. She has no rales. She exhibits no tenderness.   Abdominal: There is no tenderness. There is no rebound and no guarding.       Right Side Rheumatological Exam     Examination finds the elbow, 1st MCP, 2nd MCP, 3rd MCP, 4th MCP and 5th MCP normal.    The patient is tender to palpation of the shoulder, elbow, wrist, knee, 1st PIP, 1st MCP, 2nd PIP, 2nd MCP, 3rd PIP, 3rd MCP, 4th PIP, 4th MCP, 5th PIP and 5th MCP    She has swelling of the knee, 1st PIP, 1st MCP, 2nd PIP, 2nd MCP, 3rd PIP, 3rd MCP, 4th PIP, 4th MCP, 5th PIP and 5th MCP    The patient has an enlarged wrist, 1st PIP, 2nd PIP, 3rd PIP, 4th PIP and 5th PIP    Shoulder Exam   Tenderness Location: acromion    Range of Motion   Active abduction: abnormal   Adduction: abnormal  Sensation: normal    Knee Exam   Tenderness Location: medial joint line  Patellofemoral  Crepitus: positive  Effusion: positive  Sensation: normal    Hip Exam   Tenderness Location: no tenderness  Sensation: normal    Elbow/Wrist Exam   Tenderness Location: no tenderness  Sensation: normal    Foot Exam   Right foot exam exhibits signs of inflamed dorsum  Right foot exam exhibits signs of no podagra, no tophus and no plantar fasciitis    Muscle Strength (0-5 scale):  : 4/5     Left Side Rheumatological Exam     Examination finds the elbow, knee, 1st MCP, 2nd MCP, 3rd MCP, 4th MCP and 5th MCP normal.    The patient is tender to palpation of the shoulder, elbow, wrist, knee, 1st PIP, 1st MCP, 2nd PIP, 2nd MCP, 3rd PIP, 3rd MCP, 4th PIP, 4th MCP, 5th PIP and 5th MCP.    She has swelling of the 1st PIP, 1st MCP, 2nd PIP, 2nd MCP, 3rd PIP, 3rd MCP, 4th PIP, 4th MCP, 5th PIP and 5th MCP    The patient has an enlarged wrist, 1st PIP, 2nd PIP, 3rd PIP, 4th PIP and 5th PIP.    Shoulder Exam   Tenderness Location: acromion    Range of Motion   Active abduction: abnormal   Sensation: normal    Knee Exam   Tenderness Location: lateral joint line and medial joint line    Patellofemoral Crepitus: positive  Effusion: positive  Sensation: normal    Hip Exam   Tenderness Location: no tenderness  Sensation: normal    Elbow/Wrist Exam   Tenderness Location: lateral epicondyle and medial epicondyle  Sensation: normal    Foot Exam   Left foot exam exhibits signs of inflamed dorsum  Left foot exam exhibits signs of no podagra and no plantar fasciitis    Muscle Strength (0-5 scale):  :  4/5       Back/Neck Exam   General Inspection   Gait: normal       Tenderness Right paramedian tenderness of the Lower C-Spine and Lower L-Spine.Left paramedian tenderness of the Upper C-Spine and Lower L-Spine.      Comments:  15 out of 18 tender points    Lymphadenopathy:     She has no cervical adenopathy.   Neurological: She is alert and oriented to person, place, and time.   Reflex Scores:       Patellar reflexes are 3+ on the  right side and 3+ on the left side.  motorized wheelchair is her mode of transport   Skin: No rash noted. No erythema. No pallor.     Psychiatric: Mood and affect normal.   Musculoskeletal: She exhibits edema, tenderness and deformity.   ra changes on hands pips and B wrist are fusing              Results for orders placed or performed in visit on 07/16/18   Aldolase   Result Value Ref Range    Aldolase 4.6 1.2 - 7.6 U/L   CBC auto differential   Result Value Ref Range    WBC 8.16 3.90 - 12.70 K/uL    RBC 3.31 (L) 4.00 - 5.40 M/uL    Hemoglobin 8.4 (L) 12.0 - 16.0 g/dL    Hematocrit 28.2 (L) 37.0 - 48.5 %    MCV 85 82 - 98 fL    MCH 25.4 (L) 27.0 - 31.0 pg    MCHC 29.8 (L) 32.0 - 36.0 g/dL    RDW 15.9 (H) 11.5 - 14.5 %    Platelets 376 (H) 150 - 350 K/uL    MPV 9.3 9.2 - 12.9 fL    Gran # (ANC) 5.9 1.8 - 7.7 K/uL    Lymph # 1.3 1.0 - 4.8 K/uL    Mono # 0.7 0.3 - 1.0 K/uL    Eos # 0.3 0.0 - 0.5 K/uL    Baso # 0.02 0.00 - 0.20 K/uL    nRBC 0 0 /100 WBC    Gran% 72.2 38.0 - 73.0 %    Lymph% 15.9 (L) 18.0 - 48.0 %    Mono% 8.5 4.0 - 15.0 %    Eosinophil% 3.2 0.0 - 8.0 %    Basophil% 0.2 0.0 - 1.9 %    Differential Method Automated    Comprehensive metabolic panel   Result Value Ref Range    Sodium 141 136 - 145 mmol/L    Potassium 4.3 3.5 - 5.1 mmol/L    Chloride 105 95 - 110 mmol/L    CO2 28 22 - 31 mmol/L    Glucose 96 70 - 110 mg/dL    BUN, Bld 18 7 - 18 mg/dL    Creatinine 0.39 (L) 0.50 - 1.40 mg/dL    Calcium 9.6 8.4 - 10.2 mg/dL    Total Protein 8.3 6.0 - 8.4 g/dL    Albumin 3.8 3.5 - 5.2 g/dL    Total Bilirubin 0.2 0.2 - 1.3 mg/dL    Alkaline Phosphatase 168 (H) 38 - 145 U/L    AST 44 (H) 14 - 36 U/L    ALT 39 10 - 44 U/L    Anion Gap 8 8 - 16 mmol/L    eGFR if African American >60 >60 mL/min/1.73 m^2    eGFR if non African American >60 >60 mL/min/1.73 m^2   Iron and TIBC   Result Value Ref Range    Iron 18 (L) 30 - 160 ug/dL    TIBC 212 (L) 265 - 497 ug/dL    Iron Saturation 8 (L) 20 - 50 %   C-reactive protein    Result Value Ref Range    CRP 7.40 (H) 0.00 - 0.90 mg/dL   Rheumatoid factor   Result Value Ref Range    Rheumatoid Factor <8.6 0.0 - 15.0 IU/mL   Sedimentation rate   Result Value Ref Range    Sed Rate 91 (H) 0 - 19 mm/Hr   Cyclic citrul peptide antibody, IgG   Result Value Ref Range    CCP Antibodies 1.1 <5.0 U/mL   CK   Result Value Ref Range    CPK 21 (L) 55 - 170 U/L                                 Assessment:         Encounter Diagnoses   Name Primary?    Ankylosing spondylitis, unspecified site of spine Yes    Rheumatoid arthritis, involving unspecified site, unspecified rheumatoid factor presence     Raynaud's phenomenon without gangrene     Psoriatic arthritis     Long term current use of immunosuppressive drug          Plan:   Ankylosing spondylitis, unspecified site of spine  -     heparin, porcine (PF) 100 unit/mL injection flush 500 Units; Inject 5 mLs (500 Units total) into the vein as needed for Line Care (If port-a-cath accessed then flush line after use.).  -     sodium chloride 0.9% flush 10 mL; Inject 10 mLs into the vein as needed for Line Care.  -     acetaminophen tablet 650 mg; Take 2 tablets (650 mg total) by mouth one time.  -     diphenhydrAMINE injection 25 mg; Inject 0.5 mLs (25 mg total) into the vein one time.  -     inFLIXimab (REMICADE) 300 mg in sodium chloride 0.9% 250 mL IVPB; Inject 300 mg into the vein one time.  -     diphenhydrAMINE injection 25 mg; Inject 0.5 mLs (25 mg total) into the vein as needed (Allergic Reaction).  -     methylPREDNISolone sodium succinate injection 40 mg; Inject 40 mg into the vein as needed (IV push if benadryl does not resolve reaction).  -     acetaminophen tablet 650 mg; Take 2 tablets (650 mg total) by mouth as needed for Temperature greater than 101.  -     albuterol-ipratropium 2.5 mg-0.5 mg/3 mL nebulizer solution 3 mL; Take 3 mLs by nebulization as needed for Wheezing.  -     EPINEPHrine (PF) injection 0.3 mg; Inject 0.3 mLs (0.3 mg  total) into the skin as needed (Allergic Reaction).  -     ondansetron injection 8 mg; Inject 8 mg into the vein once.  -     CBC auto differential; Standing  -     Comprehensive metabolic panel; Standing  -     C-reactive protein; Standing  -     Sedimentation rate; Standing  -     Iron and TIBC; Standing  -     FERRITIN; Standing    Rheumatoid arthritis, involving unspecified site, unspecified rheumatoid factor presence  -     CBC auto differential; Standing  -     Comprehensive metabolic panel; Standing  -     C-reactive protein; Standing  -     Sedimentation rate; Standing  -     Iron and TIBC; Standing  -     FERRITIN; Standing    Raynaud's phenomenon without gangrene  -     CBC auto differential; Standing  -     Comprehensive metabolic panel; Standing  -     C-reactive protein; Standing  -     Sedimentation rate; Standing  -     Iron and TIBC; Standing  -     FERRITIN; Standing    Psoriatic arthritis  -     CBC auto differential; Standing  -     Comprehensive metabolic panel; Standing  -     C-reactive protein; Standing  -     Sedimentation rate; Standing  -     Iron and TIBC; Standing  -     FERRITIN; Standing    Long term current use of immunosuppressive drug  -     CBC auto differential; Standing  -     Comprehensive metabolic panel; Standing  -     C-reactive protein; Standing  -     Sedimentation rate; Standing  -     Iron and TIBC; Standing  -     FERRITIN; Standing    Abnormal level of blood mineral   -     Iron and TIBC; Standing    Other abnormality of red blood cells   -     FERRITIN; Standing    Other orders  -     ferumoxytol (FERAHEME) 510 mg in dextrose 5 % 100 mL IVPB; Inject 510 mg into the vein once.  -     heparin, porcine (PF) 100 unit/mL injection flush 500 Units; Inject 5 mLs (500 Units total) into the vein as needed (Flush lines as needed.).  -     sodium chloride 0.9% flush 10 mL; Inject 10 mLs into the vein as needed for Line Care.  -     sodium chloride 0.9% 100 mL flush bag; Inject  into the vein as needed for flush bag.  -     inFLIXimab (REMICADE) 300 mg in sodium chloride 0.9% 250 mL IVPB; Inject 300 mg into the vein once.       Will get assistance from [facility] and consider. The patient required  24 hour assistance to help with ADLs. We will DC the benlysta as it has not slowed the progression of her inflammatory arthritis. considerthat she has tried and failed most of my more aggressive medication. We will start Kinerect asap

## 2018-09-06 ENCOUNTER — TELEPHONE (OUTPATIENT)
Dept: ADMINISTRATIVE | Facility: CLINIC | Age: 43
End: 2018-09-06

## 2018-09-10 ENCOUNTER — TELEPHONE (OUTPATIENT)
Dept: SURGERY | Facility: CLINIC | Age: 43
End: 2018-09-10

## 2018-09-10 NOTE — TELEPHONE ENCOUNTER
----- Message from Laya Singh sent at 9/10/2018  8:57 AM CDT -----  Type: Needs to reschedule    Who Called:  Patient  Best Call Back Number: [phone]  Additional Information: Patient needs to reschedule this morning's appointment/requesting another day later time around 11:45 or after in [place] ONLY/please call back to reschedule or advise.

## 2018-09-10 NOTE — TELEPHONE ENCOUNTER
Pt canceled and will call back to reschedule with another provider to accommodate the necessary time schedule that she needs

## 2018-09-14 ENCOUNTER — INFUSION (OUTPATIENT)
Dept: INFUSION THERAPY | Facility: HOSPITAL | Age: 43
End: 2018-09-14
Attending: INTERNAL MEDICINE
Payer: MEDICARE

## 2018-09-14 ENCOUNTER — DOCUMENTATION ONLY (OUTPATIENT)
Dept: INFUSION THERAPY | Facility: HOSPITAL | Age: 43
End: 2018-09-14

## 2018-09-14 ENCOUNTER — TELEPHONE (OUTPATIENT)
Dept: RHEUMATOLOGY | Facility: CLINIC | Age: 43
End: 2018-09-14

## 2018-09-14 VITALS
HEART RATE: 116 BPM | RESPIRATION RATE: 20 BRPM | OXYGEN SATURATION: 97 % | HEIGHT: 60 IN | WEIGHT: 115 LBS | SYSTOLIC BLOOD PRESSURE: 139 MMHG | BODY MASS INDEX: 22.58 KG/M2 | TEMPERATURE: 99 F | DIASTOLIC BLOOD PRESSURE: 89 MMHG

## 2018-09-14 DIAGNOSIS — M33.20 IDIOPATHIC POLYMYOSITIS: ICD-10-CM

## 2018-09-14 DIAGNOSIS — Z79.899 LONG TERM CURRENT USE OF IMMUNOSUPPRESSIVE DRUG: ICD-10-CM

## 2018-09-14 DIAGNOSIS — D83.9 CVID (COMMON VARIABLE IMMUNODEFICIENCY): ICD-10-CM

## 2018-09-14 DIAGNOSIS — L40.50 PSORIATIC ARTHRITIS: ICD-10-CM

## 2018-09-14 DIAGNOSIS — M45.9 ANKYLOSING SPONDYLITIS, UNSPECIFIED SITE OF SPINE: Primary | ICD-10-CM

## 2018-09-14 DIAGNOSIS — I73.00 RAYNAUD'S PHENOMENON WITHOUT GANGRENE: ICD-10-CM

## 2018-09-14 DIAGNOSIS — E61.1 IRON DEFICIENCY: ICD-10-CM

## 2018-09-14 DIAGNOSIS — D50.9 IRON DEFICIENCY ANEMIA, UNSPECIFIED IRON DEFICIENCY ANEMIA TYPE: ICD-10-CM

## 2018-09-14 DIAGNOSIS — M06.9 RHEUMATOID ARTHRITIS, INVOLVING UNSPECIFIED SITE, UNSPECIFIED RHEUMATOID FACTOR PRESENCE: ICD-10-CM

## 2018-09-14 LAB
ALBUMIN SERPL BCP-MCNC: 3.7 G/DL
ALP SERPL-CCNC: 251 U/L
ALT SERPL W/O P-5'-P-CCNC: 41 U/L
ANION GAP SERPL CALC-SCNC: 7 MMOL/L
AST SERPL-CCNC: 40 U/L
BASOPHILS # BLD AUTO: 0.03 K/UL
BASOPHILS NFR BLD: 0.2 %
BILIRUB SERPL-MCNC: 0.5 MG/DL
BUN SERPL-MCNC: 13 MG/DL
CALCIUM SERPL-MCNC: 9.6 MG/DL
CHLORIDE SERPL-SCNC: 102 MMOL/L
CO2 SERPL-SCNC: 29 MMOL/L
CREAT SERPL-MCNC: 0.32 MG/DL
CRP SERPL-MCNC: 7.3 MG/DL
DIFFERENTIAL METHOD: ABNORMAL
EOSINOPHIL # BLD AUTO: 0.1 K/UL
EOSINOPHIL NFR BLD: 0.7 %
ERYTHROCYTE [DISTWIDTH] IN BLOOD BY AUTOMATED COUNT: 14.8 %
ERYTHROCYTE [SEDIMENTATION RATE] IN BLOOD: 81 MM/HR
EST. GFR  (AFRICAN AMERICAN): >60 ML/MIN/1.73 M^2
EST. GFR  (NON AFRICAN AMERICAN): >60 ML/MIN/1.73 M^2
GLUCOSE SERPL-MCNC: 143 MG/DL
HCT VFR BLD AUTO: 29.1 %
HGB BLD-MCNC: 8.7 G/DL
LYMPHOCYTES # BLD AUTO: 1.2 K/UL
LYMPHOCYTES NFR BLD: 7.3 %
MCH RBC QN AUTO: 25.6 PG
MCHC RBC AUTO-ENTMCNC: 29.9 G/DL
MCV RBC AUTO: 86 FL
MONOCYTES # BLD AUTO: 0.6 K/UL
MONOCYTES NFR BLD: 3.8 %
NEUTROPHILS # BLD AUTO: 14.1 K/UL
NEUTROPHILS NFR BLD: 88 %
NRBC BLD-RTO: 0 /100 WBC
PLATELET # BLD AUTO: 384 K/UL
PMV BLD AUTO: 9.3 FL
POTASSIUM SERPL-SCNC: 4 MMOL/L
PROT SERPL-MCNC: 8.3 G/DL
RBC # BLD AUTO: 3.4 M/UL
SODIUM SERPL-SCNC: 138 MMOL/L
WBC # BLD AUTO: 16.04 K/UL

## 2018-09-14 PROCEDURE — 80053 COMPREHEN METABOLIC PANEL: CPT

## 2018-09-14 PROCEDURE — 96375 TX/PRO/DX INJ NEW DRUG ADDON: CPT | Mod: PN

## 2018-09-14 PROCEDURE — 63600175 PHARM REV CODE 636 W HCPCS: Mod: TB,PN | Performed by: INTERNAL MEDICINE

## 2018-09-14 PROCEDURE — 85025 COMPLETE CBC W/AUTO DIFF WBC: CPT | Mod: PN

## 2018-09-14 PROCEDURE — 25000003 PHARM REV CODE 250: Mod: PN | Performed by: INTERNAL MEDICINE

## 2018-09-14 PROCEDURE — 96413 CHEMO IV INFUSION 1 HR: CPT | Mod: PN

## 2018-09-14 PROCEDURE — 96367 TX/PROPH/DG ADDL SEQ IV INF: CPT | Mod: PN

## 2018-09-14 PROCEDURE — 85652 RBC SED RATE AUTOMATED: CPT | Mod: PN

## 2018-09-14 PROCEDURE — A4216 STERILE WATER/SALINE, 10 ML: HCPCS | Mod: PN | Performed by: INTERNAL MEDICINE

## 2018-09-14 PROCEDURE — 86140 C-REACTIVE PROTEIN: CPT | Mod: PN

## 2018-09-14 PROCEDURE — 86140 C-REACTIVE PROTEIN: CPT

## 2018-09-14 PROCEDURE — 85652 RBC SED RATE AUTOMATED: CPT

## 2018-09-14 PROCEDURE — 85025 COMPLETE CBC W/AUTO DIFF WBC: CPT

## 2018-09-14 PROCEDURE — 80053 COMPREHEN METABOLIC PANEL: CPT | Mod: PN

## 2018-09-14 PROCEDURE — 96415 CHEMO IV INFUSION ADDL HR: CPT | Mod: PN

## 2018-09-14 RX ORDER — IPRATROPIUM BROMIDE AND ALBUTEROL SULFATE 2.5; .5 MG/3ML; MG/3ML
3 SOLUTION RESPIRATORY (INHALATION)
Status: CANCELLED | OUTPATIENT
Start: 2018-09-14

## 2018-09-14 RX ORDER — SODIUM CHLORIDE 0.9 % (FLUSH) 0.9 %
10 SYRINGE (ML) INJECTION
Status: DISCONTINUED | OUTPATIENT
Start: 2018-09-14 | End: 2018-09-14 | Stop reason: HOSPADM

## 2018-09-14 RX ORDER — EPINEPHRINE 1 MG/ML
0.3 INJECTION, SOLUTION, CONCENTRATE INTRAVENOUS
Status: CANCELLED | OUTPATIENT
Start: 2018-09-14

## 2018-09-14 RX ORDER — ACETAMINOPHEN 325 MG/1
650 TABLET ORAL
Status: CANCELLED | OUTPATIENT
Start: 2018-09-14

## 2018-09-14 RX ORDER — PALONOSETRON 0.05 MG/ML
0.25 INJECTION, SOLUTION INTRAVENOUS ONCE
Status: CANCELLED
Start: 2018-09-14 | End: 2018-09-14

## 2018-09-14 RX ORDER — DIPHENHYDRAMINE HYDROCHLORIDE 50 MG/ML
50 INJECTION INTRAMUSCULAR; INTRAVENOUS ONCE
Status: CANCELLED | OUTPATIENT
Start: 2018-09-14

## 2018-09-14 RX ORDER — DIPHENHYDRAMINE HYDROCHLORIDE 50 MG/ML
25 INJECTION INTRAMUSCULAR; INTRAVENOUS
Status: CANCELLED | OUTPATIENT
Start: 2018-09-14

## 2018-09-14 RX ORDER — HEPARIN 100 UNIT/ML
500 SYRINGE INTRAVENOUS
Status: CANCELLED | OUTPATIENT
Start: 2018-09-14

## 2018-09-14 RX ORDER — ONDANSETRON 2 MG/ML
8 INJECTION INTRAMUSCULAR; INTRAVENOUS ONCE
Status: CANCELLED
Start: 2018-09-14 | End: 2018-09-14

## 2018-09-14 RX ORDER — METHYLPREDNISOLONE SOD SUCC 125 MG
40 VIAL (EA) INJECTION
Status: CANCELLED | OUTPATIENT
Start: 2018-09-14

## 2018-09-14 RX ORDER — HEPARIN 100 UNIT/ML
500 SYRINGE INTRAVENOUS
Status: DISCONTINUED | OUTPATIENT
Start: 2018-09-14 | End: 2018-09-14 | Stop reason: HOSPADM

## 2018-09-14 RX ORDER — SODIUM CHLORIDE 0.9 % (FLUSH) 0.9 %
10 SYRINGE (ML) INJECTION
Status: CANCELLED | OUTPATIENT
Start: 2018-09-14

## 2018-09-14 RX ORDER — PALONOSETRON 0.05 MG/ML
0.25 INJECTION, SOLUTION INTRAVENOUS ONCE
Status: COMPLETED | OUTPATIENT
Start: 2018-09-14 | End: 2018-09-14

## 2018-09-14 RX ORDER — ACETAMINOPHEN 325 MG/1
650 TABLET ORAL
Status: COMPLETED | OUTPATIENT
Start: 2018-09-14 | End: 2018-09-14

## 2018-09-14 RX ADMIN — FERUMOXYTOL 510 MG: 510 INJECTION INTRAVENOUS at 04:09

## 2018-09-14 RX ADMIN — SODIUM CHLORIDE: 900 INJECTION, SOLUTION INTRAVENOUS at 12:09

## 2018-09-14 RX ADMIN — Medication 10 ML: at 05:09

## 2018-09-14 RX ADMIN — PALONOSETRON HYDROCHLORIDE 0.25 MG: 0.25 INJECTION, SOLUTION INTRAVENOUS at 12:09

## 2018-09-14 RX ADMIN — INFLIXIMAB 310 MG: 100 INJECTION, POWDER, LYOPHILIZED, FOR SOLUTION INTRAVENOUS at 01:09

## 2018-09-14 RX ADMIN — DIPHENHYDRAMINE HYDROCHLORIDE 50 MG: 50 INJECTION, SOLUTION INTRAMUSCULAR; INTRAVENOUS at 12:09

## 2018-09-14 RX ADMIN — ACETAMINOPHEN 650 MG: 325 TABLET, FILM COATED ORAL at 12:09

## 2018-09-14 NOTE — PROGRESS NOTES
Per Dr. Montaño- admin aloxi and bendadryl 50mg IV with each Remicade due to prev nausea and reactions during infusions

## 2018-09-14 NOTE — PLAN OF CARE
Problem: Patient Care Overview  Goal: Plan of Care Review  Outcome: Ongoing (interventions implemented as appropriate)  Pt tolerated infusions well, pt a small bout of nausea but the nausea resolved, NAD, no new c/o voiced, pt was observed for 30 minutes post Feraheme infusion, pt given an AVS with appointment schedule, pt left the clinic via her motorized WC accompanied by her mother.

## 2018-09-14 NOTE — TELEPHONE ENCOUNTER
----- Message from Linda Armas PharmD sent at 9/13/2018  1:26 PM CDT -----  Did Dr. Montaño want to start the initial dose of Remicade at 0,2 and then 6 weeks for maintenance? The plan is in for every 6 weeks.

## 2018-09-18 ENCOUNTER — INFUSION (OUTPATIENT)
Dept: INFUSION THERAPY | Facility: HOSPITAL | Age: 43
End: 2018-09-18
Attending: INTERNAL MEDICINE
Payer: MEDICARE

## 2018-09-18 VITALS — TEMPERATURE: 99 F | DIASTOLIC BLOOD PRESSURE: 89 MMHG | SYSTOLIC BLOOD PRESSURE: 128 MMHG | HEART RATE: 92 BPM

## 2018-09-18 DIAGNOSIS — E61.1 IRON DEFICIENCY: ICD-10-CM

## 2018-09-18 DIAGNOSIS — D83.9 CVID (COMMON VARIABLE IMMUNODEFICIENCY): ICD-10-CM

## 2018-09-18 DIAGNOSIS — D50.9 IRON DEFICIENCY ANEMIA, UNSPECIFIED IRON DEFICIENCY ANEMIA TYPE: ICD-10-CM

## 2018-09-18 DIAGNOSIS — M45.9 ANKYLOSING SPONDYLITIS, UNSPECIFIED SITE OF SPINE: Primary | ICD-10-CM

## 2018-09-18 DIAGNOSIS — M33.20 IDIOPATHIC POLYMYOSITIS: ICD-10-CM

## 2018-09-18 PROCEDURE — 25000003 PHARM REV CODE 250: Mod: PN | Performed by: INTERNAL MEDICINE

## 2018-09-18 PROCEDURE — 96374 THER/PROPH/DIAG INJ IV PUSH: CPT | Mod: PN

## 2018-09-18 PROCEDURE — 63600175 PHARM REV CODE 636 W HCPCS: Mod: TB,PN | Performed by: INTERNAL MEDICINE

## 2018-09-18 RX ORDER — ONDANSETRON 2 MG/ML
8 INJECTION INTRAMUSCULAR; INTRAVENOUS ONCE
Status: CANCELLED
Start: 2018-09-18 | End: 2018-09-18

## 2018-09-18 RX ORDER — PALONOSETRON 0.05 MG/ML
0.25 INJECTION, SOLUTION INTRAVENOUS ONCE
Status: CANCELLED
Start: 2018-09-18 | End: 2018-09-18

## 2018-09-18 RX ORDER — IPRATROPIUM BROMIDE AND ALBUTEROL SULFATE 2.5; .5 MG/3ML; MG/3ML
3 SOLUTION RESPIRATORY (INHALATION)
Status: CANCELLED | OUTPATIENT
Start: 2018-09-18

## 2018-09-18 RX ORDER — DIPHENHYDRAMINE HYDROCHLORIDE 50 MG/ML
50 INJECTION INTRAMUSCULAR; INTRAVENOUS ONCE
Status: CANCELLED | OUTPATIENT
Start: 2018-09-18

## 2018-09-18 RX ORDER — SODIUM CHLORIDE 0.9 % (FLUSH) 0.9 %
10 SYRINGE (ML) INJECTION
Status: DISCONTINUED | OUTPATIENT
Start: 2018-09-18 | End: 2018-09-18 | Stop reason: HOSPADM

## 2018-09-18 RX ORDER — ACETAMINOPHEN 325 MG/1
650 TABLET ORAL
Status: CANCELLED | OUTPATIENT
Start: 2018-09-18

## 2018-09-18 RX ORDER — HEPARIN 100 UNIT/ML
500 SYRINGE INTRAVENOUS
Status: CANCELLED | OUTPATIENT
Start: 2018-09-18

## 2018-09-18 RX ORDER — METHYLPREDNISOLONE SOD SUCC 125 MG
40 VIAL (EA) INJECTION
Status: CANCELLED | OUTPATIENT
Start: 2018-09-18

## 2018-09-18 RX ORDER — DIPHENHYDRAMINE HYDROCHLORIDE 50 MG/ML
25 INJECTION INTRAMUSCULAR; INTRAVENOUS
Status: CANCELLED | OUTPATIENT
Start: 2018-09-18

## 2018-09-18 RX ORDER — SODIUM CHLORIDE 0.9 % (FLUSH) 0.9 %
10 SYRINGE (ML) INJECTION
Status: CANCELLED | OUTPATIENT
Start: 2018-09-18

## 2018-09-18 RX ORDER — EPINEPHRINE 1 MG/ML
0.3 INJECTION, SOLUTION, CONCENTRATE INTRAVENOUS
Status: CANCELLED | OUTPATIENT
Start: 2018-09-18

## 2018-09-18 RX ADMIN — FERUMOXYTOL 510 MG: 510 INJECTION INTRAVENOUS at 03:09

## 2018-10-03 ENCOUNTER — PATIENT MESSAGE (OUTPATIENT)
Dept: RHEUMATOLOGY | Facility: CLINIC | Age: 43
End: 2018-10-03

## 2018-10-13 ENCOUNTER — PATIENT MESSAGE (OUTPATIENT)
Dept: RHEUMATOLOGY | Facility: CLINIC | Age: 43
End: 2018-10-13

## 2018-10-13 DIAGNOSIS — D80.5 IMMUNODEFICIENCY WITH INCREASED IGM: Primary | ICD-10-CM

## 2018-10-13 DIAGNOSIS — D64.9 ANEMIA, UNSPECIFIED TYPE: ICD-10-CM

## 2018-10-13 DIAGNOSIS — M06.9 RHEUMATOID ARTHRITIS OF HAND, UNSPECIFIED LATERALITY, UNSPECIFIED RHEUMATOID FACTOR PRESENCE: ICD-10-CM

## 2018-10-16 NOTE — TELEPHONE ENCOUNTER
Please get the information to Holli about the legal department and how she should contact from also I reorder labs so that we can check and see where your levels are

## 2018-11-01 ENCOUNTER — INFUSION (OUTPATIENT)
Dept: INFUSION THERAPY | Facility: HOSPITAL | Age: 43
End: 2018-11-01
Attending: INTERNAL MEDICINE
Payer: MEDICARE

## 2018-11-01 ENCOUNTER — DOCUMENTATION ONLY (OUTPATIENT)
Dept: INFUSION THERAPY | Facility: HOSPITAL | Age: 43
End: 2018-11-01

## 2018-11-01 VITALS
SYSTOLIC BLOOD PRESSURE: 137 MMHG | WEIGHT: 115 LBS | HEART RATE: 111 BPM | BODY MASS INDEX: 22.46 KG/M2 | TEMPERATURE: 99 F | RESPIRATION RATE: 16 BRPM | DIASTOLIC BLOOD PRESSURE: 97 MMHG

## 2018-11-01 DIAGNOSIS — R79.0 ABNORMAL LEVEL OF BLOOD MINERAL: ICD-10-CM

## 2018-11-01 DIAGNOSIS — R71.8 OTHER ABNORMALITY OF RED BLOOD CELLS: ICD-10-CM

## 2018-11-01 DIAGNOSIS — M45.9 ANKYLOSING SPONDYLITIS, UNSPECIFIED SITE OF SPINE: Primary | ICD-10-CM

## 2018-11-01 DIAGNOSIS — Z79.899 LONG TERM CURRENT USE OF IMMUNOSUPPRESSIVE DRUG: ICD-10-CM

## 2018-11-01 DIAGNOSIS — D50.9 IRON DEFICIENCY ANEMIA, UNSPECIFIED IRON DEFICIENCY ANEMIA TYPE: ICD-10-CM

## 2018-11-01 DIAGNOSIS — L40.50 PSORIATIC ARTHRITIS: ICD-10-CM

## 2018-11-01 DIAGNOSIS — M06.9 RHEUMATOID ARTHRITIS, INVOLVING UNSPECIFIED SITE, UNSPECIFIED RHEUMATOID FACTOR PRESENCE: ICD-10-CM

## 2018-11-01 DIAGNOSIS — E61.1 IRON DEFICIENCY: ICD-10-CM

## 2018-11-01 DIAGNOSIS — D83.9 CVID (COMMON VARIABLE IMMUNODEFICIENCY): ICD-10-CM

## 2018-11-01 DIAGNOSIS — I73.00 RAYNAUD'S PHENOMENON WITHOUT GANGRENE: ICD-10-CM

## 2018-11-01 DIAGNOSIS — M33.20 IDIOPATHIC POLYMYOSITIS: ICD-10-CM

## 2018-11-01 LAB
ALBUMIN SERPL BCP-MCNC: 3.9 G/DL
ALP SERPL-CCNC: 204 U/L
ALT SERPL W/O P-5'-P-CCNC: 19 U/L
ANION GAP SERPL CALC-SCNC: 10 MMOL/L
AST SERPL-CCNC: 25 U/L
BASOPHILS # BLD AUTO: 0.02 K/UL
BASOPHILS NFR BLD: 0.2 %
BILIRUB SERPL-MCNC: 0.4 MG/DL
BUN SERPL-MCNC: 14 MG/DL
CALCIUM SERPL-MCNC: 9.6 MG/DL
CHLORIDE SERPL-SCNC: 100 MMOL/L
CO2 SERPL-SCNC: 28 MMOL/L
CREAT SERPL-MCNC: 0.38 MG/DL
CRP SERPL-MCNC: 6.8 MG/DL
DIFFERENTIAL METHOD: ABNORMAL
EOSINOPHIL # BLD AUTO: 0.1 K/UL
EOSINOPHIL NFR BLD: 0.7 %
ERYTHROCYTE [DISTWIDTH] IN BLOOD BY AUTOMATED COUNT: 15.9 %
ERYTHROCYTE [SEDIMENTATION RATE] IN BLOOD: 46 MM/HR
EST. GFR  (AFRICAN AMERICAN): >60 ML/MIN/1.73 M^2
EST. GFR  (NON AFRICAN AMERICAN): >60 ML/MIN/1.73 M^2
FERRITIN SERPL-MCNC: 406 NG/ML
GLUCOSE SERPL-MCNC: 122 MG/DL
HCT VFR BLD AUTO: 22.6 %
HGB BLD-MCNC: 7 G/DL
IRON SATN MFR SERPL: 15 %
IRON SERPL-MCNC: 30 UG/DL
LYMPHOCYTES # BLD AUTO: 1.7 K/UL
LYMPHOCYTES NFR BLD: 15.9 %
MCH RBC QN AUTO: 27.3 PG
MCHC RBC AUTO-ENTMCNC: 31 G/DL
MCV RBC AUTO: 88 FL
MONOCYTES # BLD AUTO: 0.5 K/UL
MONOCYTES NFR BLD: 4.1 %
NEUTROPHILS # BLD AUTO: 8.6 K/UL
NEUTROPHILS NFR BLD: 79.1 %
NRBC BLD-RTO: 0 /100 WBC
PLATELET # BLD AUTO: 416 K/UL
PMV BLD AUTO: 9.6 FL
POTASSIUM SERPL-SCNC: 4 MMOL/L
PROT SERPL-MCNC: 8.7 G/DL
RBC # BLD AUTO: 2.56 M/UL
SODIUM SERPL-SCNC: 138 MMOL/L
TOTAL IRON BINDING CAPACITY: 200 UG/DL
WBC # BLD AUTO: 10.86 K/UL

## 2018-11-01 PROCEDURE — 96375 TX/PRO/DX INJ NEW DRUG ADDON: CPT | Mod: PN

## 2018-11-01 PROCEDURE — 85652 RBC SED RATE AUTOMATED: CPT

## 2018-11-01 PROCEDURE — 83540 ASSAY OF IRON: CPT | Mod: PN

## 2018-11-01 PROCEDURE — 63600175 PHARM REV CODE 636 W HCPCS: Mod: PN | Performed by: INTERNAL MEDICINE

## 2018-11-01 PROCEDURE — 86140 C-REACTIVE PROTEIN: CPT | Mod: PN

## 2018-11-01 PROCEDURE — 80053 COMPREHEN METABOLIC PANEL: CPT

## 2018-11-01 PROCEDURE — 25000003 PHARM REV CODE 250: Mod: PN | Performed by: INTERNAL MEDICINE

## 2018-11-01 PROCEDURE — 85025 COMPLETE CBC W/AUTO DIFF WBC: CPT | Mod: PN

## 2018-11-01 PROCEDURE — 96367 TX/PROPH/DG ADDL SEQ IV INF: CPT | Mod: PN

## 2018-11-01 PROCEDURE — 85025 COMPLETE CBC W/AUTO DIFF WBC: CPT

## 2018-11-01 PROCEDURE — 85652 RBC SED RATE AUTOMATED: CPT | Mod: PN

## 2018-11-01 PROCEDURE — 82728 ASSAY OF FERRITIN: CPT | Mod: PN

## 2018-11-01 PROCEDURE — 83540 ASSAY OF IRON: CPT

## 2018-11-01 PROCEDURE — 82728 ASSAY OF FERRITIN: CPT

## 2018-11-01 PROCEDURE — 96413 CHEMO IV INFUSION 1 HR: CPT | Mod: PN

## 2018-11-01 PROCEDURE — 86140 C-REACTIVE PROTEIN: CPT

## 2018-11-01 PROCEDURE — 80053 COMPREHEN METABOLIC PANEL: CPT | Mod: PN

## 2018-11-01 RX ORDER — DIPHENHYDRAMINE HYDROCHLORIDE 50 MG/ML
25 INJECTION INTRAMUSCULAR; INTRAVENOUS
Status: CANCELLED | OUTPATIENT
Start: 2018-11-01

## 2018-11-01 RX ORDER — HEPARIN 100 UNIT/ML
500 SYRINGE INTRAVENOUS
Status: CANCELLED | OUTPATIENT
Start: 2018-11-01

## 2018-11-01 RX ORDER — EPINEPHRINE 1 MG/ML
0.3 INJECTION, SOLUTION, CONCENTRATE INTRAVENOUS
Status: CANCELLED | OUTPATIENT
Start: 2018-11-01

## 2018-11-01 RX ORDER — ACETAMINOPHEN 325 MG/1
650 TABLET ORAL
Status: CANCELLED | OUTPATIENT
Start: 2018-11-01

## 2018-11-01 RX ORDER — PALONOSETRON 0.05 MG/ML
0.25 INJECTION, SOLUTION INTRAVENOUS ONCE
Status: COMPLETED | OUTPATIENT
Start: 2018-11-01 | End: 2018-11-01

## 2018-11-01 RX ORDER — METHYLPREDNISOLONE SOD SUCC 125 MG
40 VIAL (EA) INJECTION
Status: CANCELLED | OUTPATIENT
Start: 2018-11-01

## 2018-11-01 RX ORDER — SODIUM CHLORIDE 0.9 % (FLUSH) 0.9 %
10 SYRINGE (ML) INJECTION
Status: CANCELLED | OUTPATIENT
Start: 2018-11-01

## 2018-11-01 RX ORDER — SODIUM CHLORIDE 0.9 % (FLUSH) 0.9 %
10 SYRINGE (ML) INJECTION
Status: DISCONTINUED | OUTPATIENT
Start: 2018-11-01 | End: 2018-11-01 | Stop reason: HOSPADM

## 2018-11-01 RX ORDER — HEPARIN 100 UNIT/ML
500 SYRINGE INTRAVENOUS
Status: DISCONTINUED | OUTPATIENT
Start: 2018-11-01 | End: 2018-11-01 | Stop reason: HOSPADM

## 2018-11-01 RX ORDER — PALONOSETRON 0.05 MG/ML
0.25 INJECTION, SOLUTION INTRAVENOUS ONCE
Status: CANCELLED
Start: 2018-11-01 | End: 2018-11-01

## 2018-11-01 RX ORDER — ONDANSETRON 2 MG/ML
8 INJECTION INTRAMUSCULAR; INTRAVENOUS ONCE
Status: COMPLETED | OUTPATIENT
Start: 2018-11-01 | End: 2018-11-01

## 2018-11-01 RX ORDER — ONDANSETRON 2 MG/ML
8 INJECTION INTRAMUSCULAR; INTRAVENOUS ONCE
Status: CANCELLED
Start: 2018-11-01 | End: 2018-11-01

## 2018-11-01 RX ORDER — ACETAMINOPHEN 325 MG/1
650 TABLET ORAL
Status: COMPLETED | OUTPATIENT
Start: 2018-11-01 | End: 2018-11-01

## 2018-11-01 RX ORDER — IPRATROPIUM BROMIDE AND ALBUTEROL SULFATE 2.5; .5 MG/3ML; MG/3ML
3 SOLUTION RESPIRATORY (INHALATION)
Status: CANCELLED | OUTPATIENT
Start: 2018-11-01

## 2018-11-01 RX ADMIN — ACETAMINOPHEN 650 MG: 325 TABLET, FILM COATED ORAL at 02:11

## 2018-11-01 RX ADMIN — ONDANSETRON 8 MG: 2 INJECTION, SOLUTION INTRAMUSCULAR; INTRAVENOUS at 04:11

## 2018-11-01 RX ADMIN — DIPHENHYDRAMINE HYDROCHLORIDE 50 MG: 50 INJECTION, SOLUTION INTRAMUSCULAR; INTRAVENOUS at 02:11

## 2018-11-01 RX ADMIN — PALONOSETRON HYDROCHLORIDE 0.25 MG: 0.25 INJECTION, SOLUTION INTRAVENOUS at 03:11

## 2018-11-01 RX ADMIN — INFLIXIMAB 310 MG: 100 INJECTION, POWDER, LYOPHILIZED, FOR SOLUTION INTRAVENOUS at 03:11

## 2018-11-01 NOTE — PROGRESS NOTES
[11/1/2018 4:40 PM]  Celeste Mueller RN  we are taking care of huval [id number] for remicade and she is not even 1 hour into infusion and she is complaining of nausea we had to stop the infusion and give the prn zofran and the patient is asking if she should proceed with the treatment since she is not tolerating it the last treatment she went home and throw up all night i need to know what Becca wants us to do whether continue or discontinue the infusion for now         [11/1/2018 4:49 PM]  Shavonne Miramontes:    ok, becca is in a room. Let me check.      [11/1/2018 4:50 PM]  Shavonne Miramontes:    calling now    [id number] order Dr. Montaño discontinue infusions of remicade and patient to go to er for low h/h and call Dr. Montaño's office to discuss plan for further treatment.

## 2018-11-02 ENCOUNTER — TELEPHONE (OUTPATIENT)
Dept: ADMINISTRATIVE | Facility: CLINIC | Age: 43
End: 2018-11-02

## 2018-11-02 ENCOUNTER — TELEPHONE (OUTPATIENT)
Dept: RHEUMATOLOGY | Facility: CLINIC | Age: 43
End: 2018-11-02

## 2018-11-02 NOTE — TELEPHONE ENCOUNTER
----- Message from Josie Mcgee sent at 11/2/2018 11:48 AM CDT -----  Contact: alonzo Kurtz  Type: Needs Medical Advice    Who Called:  self  Best Call Back Number: [phone]  Additional Information: patient had chemo yesterday and ended up at [facility] ER for a reaction--she was told to give you all a call today to make you aware of what happened--please give her a call back

## 2018-11-02 NOTE — TELEPHONE ENCOUNTER
Home Health Recert 10/21/2018 - 12/19/2018 with [facility] ([place] - Dr. Karson Montaño. Patient received  services.

## 2018-11-05 NOTE — TELEPHONE ENCOUNTER
----- Message from Gianna Yang sent at 11/5/2018 12:45 PM CST -----  Contact: Self  Dr Montaño gave the patient an infusion last Thursday and she had a reaction.  Sent her to [facility] to receive blood, they winded sending her home.  She called your office and have not heard from anyone.  She feels like she can't move and needs help now.  Call back at [phone] (home).  Thanks

## 2018-11-06 ENCOUNTER — PATIENT MESSAGE (OUTPATIENT)
Dept: RHEUMATOLOGY | Facility: CLINIC | Age: 43
End: 2018-11-06

## 2018-11-06 DIAGNOSIS — D89.2 PARAPROTEINEMIA: ICD-10-CM

## 2018-11-06 DIAGNOSIS — D83.9 CVID (COMMON VARIABLE IMMUNODEFICIENCY): ICD-10-CM

## 2018-11-06 DIAGNOSIS — M33.20 IDIOPATHIC POLYMYOSITIS: Primary | ICD-10-CM

## 2018-11-06 DIAGNOSIS — D50.8 OTHER IRON DEFICIENCY ANEMIA: ICD-10-CM

## 2018-11-06 DIAGNOSIS — M32.9 SYSTEMIC LUPUS ERYTHEMATOSUS, UNSPECIFIED SLE TYPE, UNSPECIFIED ORGAN INVOLVEMENT STATUS: ICD-10-CM

## 2018-11-08 NOTE — TELEPHONE ENCOUNTER
We absolutely can recheck her labs if she wants please give her call back and she can do labs at her convenience I  Placed the orders now also we need to follow-up with Heme-Onc because she may need a blood transfusion if it is at low

## 2018-11-12 ENCOUNTER — PATIENT MESSAGE (OUTPATIENT)
Dept: RHEUMATOLOGY | Facility: CLINIC | Age: 43
End: 2018-11-12

## 2018-11-26 ENCOUNTER — TELEPHONE (OUTPATIENT)
Dept: RHEUMATOLOGY | Facility: CLINIC | Age: 43
End: 2018-11-26

## 2018-11-26 DIAGNOSIS — R71.8 ABNORMAL RED BLOOD CELLS: ICD-10-CM

## 2018-11-26 DIAGNOSIS — R79.0 ABNORMAL LEVEL OF BLOOD MINERAL: Primary | ICD-10-CM

## 2018-11-27 NOTE — TELEPHONE ENCOUNTER
Spoke to GOODWIN regarding labs drawn from port. Informed office that home health does not have supplies to draw labs that patient will need to have done at out patient facility.

## 2018-11-27 NOTE — TELEPHONE ENCOUNTER
----- Message from Meghann Salas sent at 11/26/2018  4:41 PM CST -----  Contact: self  Patient need to speak to nurse regarding patient states she need blood drawn,and need a nurse to call her regarding she is too weak to get out of bed and may need a nurse to come to her home,per patient       Please call to advice [phone] (home)

## 2018-11-27 NOTE — TELEPHONE ENCOUNTER
Spoke to pt, advised Dr. Montaño ordered her STAT labs and that [facility] is unable to draw from port. Scheduled with infusion center for tomorrow and will try at pt's request to get in with Dr. Bellamy. Dr. Montaño would like her to hem/onc asap.

## 2018-11-28 ENCOUNTER — INFUSION (OUTPATIENT)
Dept: INFUSION THERAPY | Facility: HOSPITAL | Age: 43
End: 2018-11-28
Attending: INTERNAL MEDICINE
Payer: MEDICARE

## 2018-11-28 DIAGNOSIS — R79.0 ABNORMAL LEVEL OF BLOOD MINERAL: Primary | ICD-10-CM

## 2018-11-28 DIAGNOSIS — M45.9 ANKYLOSING SPONDYLITIS, UNSPECIFIED SITE OF SPINE: ICD-10-CM

## 2018-11-28 DIAGNOSIS — D50.9 IRON DEFICIENCY ANEMIA, UNSPECIFIED IRON DEFICIENCY ANEMIA TYPE: ICD-10-CM

## 2018-11-28 DIAGNOSIS — M33.20 IDIOPATHIC POLYMYOSITIS: ICD-10-CM

## 2018-11-28 DIAGNOSIS — D83.9 CVID (COMMON VARIABLE IMMUNODEFICIENCY): ICD-10-CM

## 2018-11-28 DIAGNOSIS — E61.1 IRON DEFICIENCY: ICD-10-CM

## 2018-11-28 DIAGNOSIS — R71.8 ABNORMAL RED BLOOD CELLS: ICD-10-CM

## 2018-11-28 LAB
ALBUMIN SERPL BCP-MCNC: 3.9 G/DL
ALP SERPL-CCNC: 213 U/L
ALT SERPL W/O P-5'-P-CCNC: 29 U/L
ANION GAP SERPL CALC-SCNC: 8 MMOL/L
AST SERPL-CCNC: 35 U/L
BASOPHILS # BLD AUTO: 0.03 K/UL
BASOPHILS NFR BLD: 0.3 %
BILIRUB SERPL-MCNC: 0.5 MG/DL
BUN SERPL-MCNC: 13 MG/DL
CALCIUM SERPL-MCNC: 9.4 MG/DL
CHLORIDE SERPL-SCNC: 104 MMOL/L
CO2 SERPL-SCNC: 28 MMOL/L
CREAT SERPL-MCNC: 0.34 MG/DL
CRP SERPL-MCNC: 6.5 MG/DL
DIFFERENTIAL METHOD: ABNORMAL
EOSINOPHIL # BLD AUTO: 0.1 K/UL
EOSINOPHIL NFR BLD: 0.8 %
ERYTHROCYTE [DISTWIDTH] IN BLOOD BY AUTOMATED COUNT: 14.7 %
ERYTHROCYTE [SEDIMENTATION RATE] IN BLOOD: 81 MM/HR
EST. GFR  (AFRICAN AMERICAN): >60 ML/MIN/1.73 M^2
EST. GFR  (NON AFRICAN AMERICAN): >60 ML/MIN/1.73 M^2
GLUCOSE SERPL-MCNC: 102 MG/DL
HCT VFR BLD AUTO: 31.1 %
HGB BLD-MCNC: 9.5 G/DL
LYMPHOCYTES # BLD AUTO: 1.4 K/UL
LYMPHOCYTES NFR BLD: 15.7 %
MCH RBC QN AUTO: 27.1 PG
MCHC RBC AUTO-ENTMCNC: 30.5 G/DL
MCV RBC AUTO: 89 FL
MONOCYTES # BLD AUTO: 0.5 K/UL
MONOCYTES NFR BLD: 5.6 %
NEUTROPHILS # BLD AUTO: 6.9 K/UL
NEUTROPHILS NFR BLD: 77.6 %
NRBC BLD-RTO: 0 /100 WBC
PLATELET # BLD AUTO: 384 K/UL
PMV BLD AUTO: 9.7 FL
POTASSIUM SERPL-SCNC: 4.2 MMOL/L
PROT SERPL-MCNC: 8.6 G/DL
RBC # BLD AUTO: 3.51 M/UL
SODIUM SERPL-SCNC: 140 MMOL/L
WBC # BLD AUTO: 8.94 K/UL

## 2018-11-28 PROCEDURE — 80053 COMPREHEN METABOLIC PANEL: CPT | Mod: PN

## 2018-11-28 PROCEDURE — 85025 COMPLETE CBC W/AUTO DIFF WBC: CPT | Mod: PN

## 2018-11-28 PROCEDURE — 63600175 PHARM REV CODE 636 W HCPCS: Mod: PN | Performed by: INTERNAL MEDICINE

## 2018-11-28 PROCEDURE — 86140 C-REACTIVE PROTEIN: CPT | Mod: PN

## 2018-11-28 PROCEDURE — 36591 DRAW BLOOD OFF VENOUS DEVICE: CPT | Mod: PN

## 2018-11-28 PROCEDURE — 86140 C-REACTIVE PROTEIN: CPT

## 2018-11-28 PROCEDURE — 85025 COMPLETE CBC W/AUTO DIFF WBC: CPT

## 2018-11-28 PROCEDURE — 85652 RBC SED RATE AUTOMATED: CPT | Mod: PN

## 2018-11-28 PROCEDURE — 25000003 PHARM REV CODE 250: Mod: PN | Performed by: INTERNAL MEDICINE

## 2018-11-28 PROCEDURE — 80053 COMPREHEN METABOLIC PANEL: CPT

## 2018-11-28 PROCEDURE — 85652 RBC SED RATE AUTOMATED: CPT

## 2018-11-28 PROCEDURE — A4216 STERILE WATER/SALINE, 10 ML: HCPCS | Mod: PN | Performed by: INTERNAL MEDICINE

## 2018-11-28 RX ORDER — HEPARIN 100 UNIT/ML
500 SYRINGE INTRAVENOUS
Status: DISCONTINUED | OUTPATIENT
Start: 2018-11-28 | End: 2018-11-28 | Stop reason: HOSPADM

## 2018-11-28 RX ORDER — SODIUM CHLORIDE 0.9 % (FLUSH) 0.9 %
10 SYRINGE (ML) INJECTION
Status: COMPLETED | OUTPATIENT
Start: 2018-11-28 | End: 2018-11-28

## 2018-11-28 RX ORDER — SODIUM CHLORIDE 0.9 % (FLUSH) 0.9 %
10 SYRINGE (ML) INJECTION
Status: CANCELLED | OUTPATIENT
Start: 2018-11-28

## 2018-11-28 RX ORDER — HEPARIN 100 UNIT/ML
500 SYRINGE INTRAVENOUS
Status: CANCELLED | OUTPATIENT
Start: 2018-11-28

## 2018-11-28 RX ADMIN — Medication 10 ML: at 02:11

## 2018-11-30 ENCOUNTER — TELEPHONE (OUTPATIENT)
Dept: RHEUMATOLOGY | Facility: CLINIC | Age: 43
End: 2018-11-30

## 2018-11-30 NOTE — TELEPHONE ENCOUNTER
[facility] states needs orders faxed in future for blood to be drawn from port. After faxed will need to be authorize with insurance.

## 2018-11-30 NOTE — TELEPHONE ENCOUNTER
----- Message from Vern Cody sent at 11/30/2018  8:09 AM CST -----  Contact: Giuseppe laguna/[facility]   Giuseppe is calling to speak with nurse about pt's Port Cath,pls call her back and advise   Call Back#[phone]  Thanks

## 2018-12-12 ENCOUNTER — PATIENT MESSAGE (OUTPATIENT)
Dept: RHEUMATOLOGY | Facility: CLINIC | Age: 43
End: 2018-12-12

## 2018-12-12 DIAGNOSIS — M45.9 ANKYLOSING SPONDYLITIS, UNSPECIFIED SITE OF SPINE: ICD-10-CM

## 2018-12-12 DIAGNOSIS — D82.9: ICD-10-CM

## 2018-12-12 DIAGNOSIS — D50.9 IRON DEFICIENCY ANEMIA, UNSPECIFIED IRON DEFICIENCY ANEMIA TYPE: ICD-10-CM

## 2018-12-12 DIAGNOSIS — D83.9 CVID (COMMON VARIABLE IMMUNODEFICIENCY): Primary | ICD-10-CM

## 2018-12-13 NOTE — TELEPHONE ENCOUNTER
Pt wants to redo labs done 11/28/2018 and wants them taken from her port again. Please advise if okay.

## 2018-12-14 DIAGNOSIS — D50.9 IRON DEFICIENCY ANEMIA, UNSPECIFIED IRON DEFICIENCY ANEMIA TYPE: ICD-10-CM

## 2018-12-14 DIAGNOSIS — D83.9 CVID (COMMON VARIABLE IMMUNODEFICIENCY): Primary | ICD-10-CM

## 2018-12-14 RX ORDER — HEPARIN 100 UNIT/ML
10 SYRINGE INTRAVENOUS
Qty: 1 SYRINGE | Refills: 4 | Status: SHIPPED | OUTPATIENT
Start: 2018-12-14

## 2018-12-14 RX ORDER — NEEDLES,SAFETY HUBER,DISPOSABL 22GX3/4"
2 NEEDLE, DISPOSABLE MISCELLANEOUS
Qty: 2 EACH | Refills: 4 | Status: SHIPPED | OUTPATIENT
Start: 2018-12-14

## 2018-12-14 RX ORDER — SODIUM CHLORIDE 0.9 % (FLUSH) 0.9 %
20 SYRINGE (ML) INJECTION
Qty: 20 ML | Refills: 4 | Status: SHIPPED | OUTPATIENT
Start: 2018-12-14

## 2018-12-14 NOTE — TELEPHONE ENCOUNTER
----- Message from Jonh Aguilera sent at 12/14/2018  9:57 AM CST -----  Type: Needs Medical Advice    [facility]  Symptoms (please be specific):  NA How long has patient had these symptoms:  NA Pharmacy name and phone #:  [facility]  Best Call Back Number: [phone] Additional Information: the nurse need an order to get humber needle and flush. Call was placed to the pod.

## 2018-12-14 NOTE — TELEPHONE ENCOUNTER
Nurse Eden spoke with Rahel at [facility] , they need orders for 2 Rodriguez needles, 20mL Normal saline flush, 10mL Heplock flush faxed so they can draw labs from port and flush it.

## 2018-12-14 NOTE — TELEPHONE ENCOUNTER
Spoke with Rahel at [facility], clarified to draw labs from port at next scheduled visit with patient. No further questions.

## 2018-12-14 NOTE — TELEPHONE ENCOUNTER
Left message for Rahel to clarify what needle and flush is needed. Left back line number for her to reach us.

## 2018-12-28 ENCOUNTER — TELEPHONE (OUTPATIENT)
Dept: RHEUMATOLOGY | Facility: CLINIC | Age: 43
End: 2018-12-28

## 2018-12-28 NOTE — TELEPHONE ENCOUNTER
----- Message from Karson Montaño MD sent at 12/21/2018  5:41 PM CST -----  Alk phos is elevated esr has improved, anemia is still sig

## 2019-01-02 ENCOUNTER — TELEPHONE (OUTPATIENT)
Dept: ADMINISTRATIVE | Facility: CLINIC | Age: 44
End: 2019-01-02

## 2019-01-17 DIAGNOSIS — D83.9 CVID (COMMON VARIABLE IMMUNODEFICIENCY): Primary | ICD-10-CM

## 2019-01-17 DIAGNOSIS — D82.9: ICD-10-CM

## 2019-01-17 DIAGNOSIS — D50.9 IRON DEFICIENCY ANEMIA, UNSPECIFIED IRON DEFICIENCY ANEMIA TYPE: ICD-10-CM

## 2019-01-17 NOTE — PROGRESS NOTES
Dr. Montaño reviewed labs received from [facility]. [facility] for BIB lab to be done. Order sent to .

## 2019-01-31 ENCOUNTER — DOCUMENTATION ONLY (OUTPATIENT)
Dept: RHEUMATOLOGY | Facility: CLINIC | Age: 44
End: 2019-01-31

## 2019-02-04 DIAGNOSIS — M32.9 SYSTEMIC LUPUS ERYTHEMATOSUS, UNSPECIFIED SLE TYPE, UNSPECIFIED ORGAN INVOLVEMENT STATUS: ICD-10-CM

## 2019-02-04 DIAGNOSIS — M06.9 RHEUMATOID ARTHRITIS, INVOLVING UNSPECIFIED SITE, UNSPECIFIED RHEUMATOID FACTOR PRESENCE: ICD-10-CM

## 2019-02-04 DIAGNOSIS — L40.50 PSORIATIC ARTHRITIS: ICD-10-CM

## 2019-02-04 DIAGNOSIS — M45.9 ANKYLOSING SPONDYLITIS, UNSPECIFIED SITE OF SPINE: ICD-10-CM

## 2019-02-04 DIAGNOSIS — R29.898 SEVERE MUSCLE DECONDITIONING: ICD-10-CM

## 2019-02-05 RX ORDER — FLUCONAZOLE 150 MG/1
TABLET ORAL
Qty: 3 TABLET | Refills: 0 | Status: SHIPPED | OUTPATIENT
Start: 2019-02-05

## 2019-02-12 ENCOUNTER — PATIENT MESSAGE (OUTPATIENT)
Dept: RHEUMATOLOGY | Facility: CLINIC | Age: 44
End: 2019-02-12

## 2019-02-26 ENCOUNTER — DOCUMENTATION ONLY (OUTPATIENT)
Dept: INFUSION THERAPY | Facility: HOSPITAL | Age: 44
End: 2019-02-26

## 2019-02-26 ENCOUNTER — PATIENT MESSAGE (OUTPATIENT)
Dept: RHEUMATOLOGY | Facility: CLINIC | Age: 44
End: 2019-02-26

## 2019-02-26 NOTE — PROGRESS NOTES
Patient cancel port flush today thru my [facility] stating  : I feel too sick and Im  in too much pain to get in my wheelchair in order to go to the appointment today. I will contact someone as soon as possible to reschedule. Thanks for understanding. !! Have a great day

## 2019-03-06 ENCOUNTER — TELEPHONE (OUTPATIENT)
Dept: ADMINISTRATIVE | Facility: CLINIC | Age: 44
End: 2019-03-06

## 2019-03-06 NOTE — TELEPHONE ENCOUNTER
Home Health Recert 02/18/2019 to 04/18/2019 with [facility] , Dr Karson Montaño.  service

## 2019-05-27 ENCOUNTER — DOCUMENTATION ONLY (OUTPATIENT)
Dept: INFUSION THERAPY | Facility: HOSPITAL | Age: 44
End: 2019-05-27

## 2019-05-27 NOTE — PROGRESS NOTES
Called patient to remind her of appoinmtent tomorrow patient cancel appointment and she will call back to r/s

## 2019-05-30 ENCOUNTER — DOCUMENTATION ONLY (OUTPATIENT)
Dept: RHEUMATOLOGY | Facility: CLINIC | Age: 44
End: 2019-05-30

## 2019-05-30 NOTE — PROGRESS NOTES
Received a request of information from [facility]/ faxed to HIM request sent to scan.

## 2019-06-18 PROCEDURE — G0179 MD RECERTIFICATION HHA PT: HCPCS | Mod: ,,, | Performed by: INTERNAL MEDICINE

## 2019-06-18 PROCEDURE — G0179 PR HOME HEALTH MD RECERTIFICATION: ICD-10-PCS | Mod: ,,, | Performed by: INTERNAL MEDICINE

## 2019-07-03 ENCOUNTER — PATIENT MESSAGE (OUTPATIENT)
Dept: RHEUMATOLOGY | Facility: CLINIC | Age: 44
End: 2019-07-03

## 2019-07-03 RX ORDER — ONDANSETRON 4 MG/1
TABLET, ORALLY DISINTEGRATING ORAL
Qty: 45 TABLET | Refills: 4 | Status: SHIPPED | OUTPATIENT
Start: 2019-07-03 | End: 2020-01-20

## 2019-07-09 ENCOUNTER — EXTERNAL HOME HEALTH (OUTPATIENT)
Dept: HOME HEALTH SERVICES | Facility: HOSPITAL | Age: 44
End: 2019-07-09
Payer: MEDICARE

## 2019-07-15 ENCOUNTER — PATIENT MESSAGE (OUTPATIENT)
Dept: RHEUMATOLOGY | Facility: CLINIC | Age: 44
End: 2019-07-15

## 2019-07-19 ENCOUNTER — TELEPHONE (OUTPATIENT)
Dept: RHEUMATOLOGY | Facility: CLINIC | Age: 44
End: 2019-07-19

## 2019-07-19 NOTE — TELEPHONE ENCOUNTER
----- Message from Kerwin Lopez sent at 7/19/2019 12:25 PM CDT -----  Type: Needs Medical Advice    Who Called:  Pino/Dr. Small    Best Call Back Number: [phone]  Additional Information: Caller states that the patient is intrathecal.

## 2019-07-29 ENCOUNTER — DOCUMENTATION ONLY (OUTPATIENT)
Dept: RHEUMATOLOGY | Facility: CLINIC | Age: 44
End: 2019-07-29

## 2019-07-29 NOTE — PROGRESS NOTES
Received clarification of plan of care from [facility]. Dr Montaño reviewed and signed. Faxed to [phone]

## 2019-08-16 ENCOUNTER — PATIENT MESSAGE (OUTPATIENT)
Dept: RHEUMATOLOGY | Facility: CLINIC | Age: 44
End: 2019-08-16

## 2019-08-17 PROCEDURE — G0179 MD RECERTIFICATION HHA PT: HCPCS | Mod: ,,, | Performed by: INTERNAL MEDICINE

## 2019-08-17 PROCEDURE — G0179 PR HOME HEALTH MD RECERTIFICATION: ICD-10-PCS | Mod: ,,, | Performed by: INTERNAL MEDICINE

## 2019-08-21 ENCOUNTER — EXTERNAL HOME HEALTH (OUTPATIENT)
Dept: HOME HEALTH SERVICES | Facility: HOSPITAL | Age: 44
End: 2019-08-21
Payer: MEDICARE

## 2019-09-10 ENCOUNTER — PATIENT MESSAGE (OUTPATIENT)
Dept: RHEUMATOLOGY | Facility: CLINIC | Age: 44
End: 2019-09-10

## 2019-09-10 DIAGNOSIS — M45.9 ANKYLOSING SPONDYLITIS, UNSPECIFIED SITE OF SPINE: ICD-10-CM

## 2019-09-10 DIAGNOSIS — R71.8 ABNORMAL RED BLOOD CELLS: ICD-10-CM

## 2019-09-10 DIAGNOSIS — D83.9 CVID (COMMON VARIABLE IMMUNODEFICIENCY): Primary | ICD-10-CM

## 2019-09-10 DIAGNOSIS — R94.5 ABNORMAL RESULTS OF LIVER FUNCTION STUDIES: ICD-10-CM

## 2019-10-16 PROCEDURE — G0179 PR HOME HEALTH MD RECERTIFICATION: ICD-10-PCS | Mod: ,,, | Performed by: INTERNAL MEDICINE

## 2019-10-16 PROCEDURE — G0179 MD RECERTIFICATION HHA PT: HCPCS | Mod: ,,, | Performed by: INTERNAL MEDICINE

## 2019-10-17 ENCOUNTER — PATIENT MESSAGE (OUTPATIENT)
Dept: RHEUMATOLOGY | Facility: CLINIC | Age: 44
End: 2019-10-17

## 2019-10-18 RX ORDER — THYROID, PORCINE 180 MG/1
TABLET ORAL
Qty: 30 TABLET | Refills: 4 | Status: SHIPPED | OUTPATIENT
Start: 2019-10-18 | End: 2020-03-23 | Stop reason: SDUPTHER

## 2019-10-22 NOTE — TELEPHONE ENCOUNTER
MTX but she has anemia want to start enbrel but needs some sort of visit either in person or telemed visit.

## 2019-10-28 ENCOUNTER — PATIENT MESSAGE (OUTPATIENT)
Dept: RHEUMATOLOGY | Facility: CLINIC | Age: 44
End: 2019-10-28

## 2019-10-29 ENCOUNTER — PATIENT MESSAGE (OUTPATIENT)
Dept: RHEUMATOLOGY | Facility: CLINIC | Age: 44
End: 2019-10-29

## 2019-10-29 ENCOUNTER — EXTERNAL HOME HEALTH (OUTPATIENT)
Dept: HOME HEALTH SERVICES | Facility: HOSPITAL | Age: 44
End: 2019-10-29
Payer: MEDICARE

## 2019-11-22 ENCOUNTER — TELEPHONE (OUTPATIENT)
Dept: RHEUMATOLOGY | Facility: CLINIC | Age: 44
End: 2019-11-22

## 2019-11-22 NOTE — TELEPHONE ENCOUNTER
----- Message from Thai Mccormick sent at 11/22/2019  9:07 AM CST -----  Contact: Emma with [facility]  Type: Needs Medical Advice    Who Called:  Emma Sadler Call Back Number: [phone]  Additional Information: Would like to know if Dr. Montaño would agree to be the attending physician for the pt's hospice care. If so, would she give the pt 6 months or less and which diagnosis would she like to use. Please call to advise.

## 2019-12-05 ENCOUNTER — TELEPHONE (OUTPATIENT)
Dept: HOME HEALTH SERVICES | Facility: HOSPITAL | Age: 44
End: 2019-12-05

## 2019-12-10 NOTE — TELEPHONE ENCOUNTER
Spoke to pt, advised Dr. Montaño would like to recheck labs and we will get set over to [facility]. Advised Dr. Montaño would also like an MRI Brain if we can find one she can utilize without getting out of her chair, Pt agrees. Advised recent labs are looking better, pt reports no changes in treatment. Orders placed and faxed.  Spoke to pt, advised that attempted to find some stand up MRI that she can utilize. Unable to find anywhere that she would not have to walk into machine area and sit or lie down for MRI. Pt will check with some of the MRI facilities near her home and see if she can get  or fire department there to assist and then we will fax order and schedule. Pt will email or call with name of facility to fax MRI order to.  No further questions.    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

## 2020-01-20 RX ORDER — ONDANSETRON 4 MG/1
TABLET, ORALLY DISINTEGRATING ORAL
Qty: 45 TABLET | Refills: 4 | Status: SHIPPED | OUTPATIENT
Start: 2020-01-20 | End: 2020-03-23 | Stop reason: SDUPTHER

## 2020-02-13 ENCOUNTER — PATIENT MESSAGE (OUTPATIENT)
Dept: RHEUMATOLOGY | Facility: CLINIC | Age: 45
End: 2020-02-13

## 2020-03-02 ENCOUNTER — PATIENT MESSAGE (OUTPATIENT)
Dept: RHEUMATOLOGY | Facility: CLINIC | Age: 45
End: 2020-03-02

## 2020-03-02 DIAGNOSIS — D83.9 CVID (COMMON VARIABLE IMMUNODEFICIENCY): Primary | ICD-10-CM

## 2020-03-02 DIAGNOSIS — M06.9 RHEUMATOID ARTHRITIS OF HAND, UNSPECIFIED LATERALITY, UNSPECIFIED RHEUMATOID FACTOR PRESENCE: ICD-10-CM

## 2020-03-02 DIAGNOSIS — R94.5 ABNORMAL RESULTS OF LIVER FUNCTION STUDIES: ICD-10-CM

## 2020-03-02 DIAGNOSIS — G89.4 CHRONIC PAIN SYNDROME: ICD-10-CM

## 2020-03-02 DIAGNOSIS — M32.9 SYSTEMIC LUPUS ERYTHEMATOSUS, UNSPECIFIED SLE TYPE, UNSPECIFIED ORGAN INVOLVEMENT STATUS: ICD-10-CM

## 2020-03-02 DIAGNOSIS — D82.9: ICD-10-CM

## 2020-03-02 DIAGNOSIS — R62.7 FAILURE TO THRIVE IN ADULT: ICD-10-CM

## 2020-03-02 DIAGNOSIS — R71.8 ABNORMAL RED BLOOD CELLS: ICD-10-CM

## 2020-03-02 DIAGNOSIS — M45.9 ANKYLOSING SPONDYLITIS, UNSPECIFIED SITE OF SPINE: ICD-10-CM

## 2020-03-02 DIAGNOSIS — R77.8 ABNORMAL SPEP: ICD-10-CM

## 2020-03-05 ENCOUNTER — TELEPHONE (OUTPATIENT)
Dept: RHEUMATOLOGY | Facility: CLINIC | Age: 45
End: 2020-03-05

## 2020-03-05 NOTE — TELEPHONE ENCOUNTER
----- Message from Jenna Oneil sent at 3/5/2020 10:06 AM CST -----  Contact: Patient  Type: Needs Medical Advice    Who Called: Patient  Best Call Back Number: [phone]  Additional Information: Patient is calling to see why the referral has not been received by [facility].Please call back and advise.

## 2020-03-06 ENCOUNTER — PATIENT MESSAGE (OUTPATIENT)
Dept: RHEUMATOLOGY | Facility: CLINIC | Age: 45
End: 2020-03-06

## 2020-03-20 ENCOUNTER — PATIENT MESSAGE (OUTPATIENT)
Dept: RHEUMATOLOGY | Facility: CLINIC | Age: 45
End: 2020-03-20

## 2020-03-23 ENCOUNTER — PATIENT MESSAGE (OUTPATIENT)
Dept: RHEUMATOLOGY | Facility: CLINIC | Age: 45
End: 2020-03-23

## 2020-03-23 ENCOUNTER — TELEPHONE (OUTPATIENT)
Dept: RHEUMATOLOGY | Facility: CLINIC | Age: 45
End: 2020-03-23

## 2020-03-23 ENCOUNTER — OFFICE VISIT (OUTPATIENT)
Dept: RHEUMATOLOGY | Facility: CLINIC | Age: 45
End: 2020-03-23
Payer: MEDICARE

## 2020-03-23 DIAGNOSIS — M06.9 RHEUMATOID ARTHRITIS OF HAND, UNSPECIFIED LATERALITY, UNSPECIFIED RHEUMATOID FACTOR PRESENCE: ICD-10-CM

## 2020-03-23 DIAGNOSIS — M32.9 SYSTEMIC LUPUS ERYTHEMATOSUS, UNSPECIFIED SLE TYPE, UNSPECIFIED ORGAN INVOLVEMENT STATUS: ICD-10-CM

## 2020-03-23 DIAGNOSIS — R62.7 FAILURE TO THRIVE IN ADULT: ICD-10-CM

## 2020-03-23 DIAGNOSIS — M06.9 RHEUMATOID ARTHRITIS, INVOLVING UNSPECIFIED SITE, UNSPECIFIED RHEUMATOID FACTOR PRESENCE: ICD-10-CM

## 2020-03-23 DIAGNOSIS — J11.1 FLU SYNDROME: ICD-10-CM

## 2020-03-23 DIAGNOSIS — M45.9 ANKYLOSING SPONDYLITIS, UNSPECIFIED SITE OF SPINE: ICD-10-CM

## 2020-03-23 DIAGNOSIS — D83.9 CVID (COMMON VARIABLE IMMUNODEFICIENCY): Primary | ICD-10-CM

## 2020-03-23 DIAGNOSIS — G89.4 CHRONIC PAIN SYNDROME: ICD-10-CM

## 2020-03-23 DIAGNOSIS — L40.50 PSORIATIC ARTHRITIS: ICD-10-CM

## 2020-03-23 PROCEDURE — 99215 PR OFFICE/OUTPT VISIT, EST, LEVL V, 40-54 MIN: ICD-10-PCS | Mod: 95,GW,ICN, | Performed by: INTERNAL MEDICINE

## 2020-03-23 PROCEDURE — 99215 OFFICE O/P EST HI 40 MIN: CPT | Mod: 95,GW,ICN, | Performed by: INTERNAL MEDICINE

## 2020-03-23 RX ORDER — HYDROXYCHLOROQUINE SULFATE 200 MG/1
200 TABLET, FILM COATED ORAL 2 TIMES DAILY
Qty: 60 TABLET | Refills: 6 | Status: SHIPPED | OUTPATIENT
Start: 2020-03-23 | End: 2020-03-23 | Stop reason: SDUPTHER

## 2020-03-23 RX ORDER — FLUCONAZOLE 150 MG/1
150 TABLET ORAL DAILY
Qty: 7 TABLET | Refills: 3 | Status: SHIPPED | OUTPATIENT
Start: 2020-03-23 | End: 2020-03-30

## 2020-03-23 RX ORDER — HYDROXYCHLOROQUINE SULFATE 200 MG/1
200 TABLET, FILM COATED ORAL 2 TIMES DAILY
Qty: 60 TABLET | Refills: 6 | Status: SHIPPED | OUTPATIENT
Start: 2020-03-23 | End: 2020-04-23

## 2020-03-23 RX ORDER — FLUOCINONIDE 0.5 MG/G
OINTMENT TOPICAL
Qty: 60 G | Refills: 3 | Status: SHIPPED | OUTPATIENT
Start: 2020-03-23

## 2020-03-23 RX ORDER — ONDANSETRON 4 MG/1
TABLET, ORALLY DISINTEGRATING ORAL
Qty: 60 TABLET | Refills: 4 | Status: SHIPPED | OUTPATIENT
Start: 2020-03-23

## 2020-03-23 RX ORDER — PROMETHAZINE HYDROCHLORIDE 25 MG/1
25 TABLET ORAL EVERY 4 HOURS PRN
Qty: 45 TABLET | Refills: 3 | Status: SHIPPED | OUTPATIENT
Start: 2020-03-23 | End: 2020-04-22

## 2020-03-23 NOTE — TELEPHONE ENCOUNTER
----- Message from Ebony Miller sent at 3/23/2020 11:32 AM CDT -----  Contact: Rolanda with [facility]  Type: Needs Medical Advice  Who Called: Rolanda  Doroteo Call Back Number: [phone]  Additional Information: Rolanda called needing clarification on patient Rx in regards to Xofluza 40mg which is two tablets 80 mg dose. The Rx was written for 1 tablet 40MG. Rolanda needs to know the correct dosage. Also sent over Rx Plaquenil  don't have meds in stock and not able to order, even if its sent over to another pharmacy the board of pharmacy the Rx can only be a 14 day supply and must note a positive COVID-19 test on the Rx. Please call back and advise. Pt is hospice patient.

## 2020-03-23 NOTE — PROGRESS NOTES
Subjective:       Patient ID: Holli Kurtz is a 44 y.o. female.    Chief Complaint: Pain; Swelling; and Disease Management     Follow up: Ra,AS, sle Multiple doing poorly, off all treatment,  medication doing poorly she has hip contracture, elbow contracture,  myosititis, severe anemia,wasting away,she has noticable teeth shifting and bone growth. She has severe difficulty transferring from wheelchair to wheelchair with falls.  She is doing poorly. Patient complains of arthralgias and myalgias for which has been present for a few years. Pain is located in multiple joints, both shoulder(s), both elbow(s), both wrist(s), both MCP(s): 1st, 2nd, 3rd, 4th and 5th, both PIP(s): 1st, 2nd, 3rd, 4th and 5th, both DIP(s): 1st and 2nd, both hip(s), both knee(s) and both MTP(s): 1st, 2nd, 3rd, 4th and 5th, is described as aching, pulsating, shooting and throbbing, and is constant,severe debilitating .      Review of Systems   Constitutional: Positive for activity change, appetite change, fatigue and unexpected weight change. Negative for chills and diaphoresis.   HENT: Negative for congestion, dental problem, ear discharge, ear pain, facial swelling, mouth sores, nosebleeds, postnasal drip, rhinorrhea, sinus pressure, sneezing, sore throat, tinnitus and voice change.    Eyes: Negative for photophobia, pain, discharge, redness and itching.   Respiratory: Negative for apnea, cough, chest tightness, shortness of breath and wheezing.    Cardiovascular: Positive for leg swelling. Negative for chest pain and palpitations.   Gastrointestinal: Negative for abdominal distention, abdominal pain, constipation, diarrhea, nausea and vomiting.   Endocrine: Negative for cold intolerance, heat intolerance, polydipsia and polyuria.   Genitourinary: Negative for decreased urine volume, difficulty urinating, flank pain, frequency, hematuria and urgency.   Musculoskeletal: Positive for arthralgias, back pain, gait problem, joint  swelling, myalgias, neck pain and neck stiffness.   Skin: Negative for pallor, rash and wound.   Allergic/Immunologic: Negative for immunocompromised state.   Neurological: Negative for dizziness, tremors, weakness and numbness.   Hematological: Negative for adenopathy. Does not bruise/bleed easily.   Psychiatric/Behavioral: Negative for sleep disturbance. The patient is not nervous/anxious.          Objective:     There were no vitals taken for this visit.     Physical Exam   Constitutional: She is oriented to person, place, and time. She appears distressed.   Neurological: She is alert and oriented to person, place, and time.   Psychiatric: Affect and judgment normal. She exhibits a depressed mood.            Results for orders placed or performed in visit on 09/18/19   Reticulocytes   Result Value Ref Range    Retic 1.0 0.5 - 2.5 %   Hepatitis panel, acute   Result Value Ref Range    Hepatitis B Surface Ag Negative     Hep B C IgM Negative     Hep A IgM Negative     Hepatitis C Ab Negative    T-SPOT TB Screening Test   Result Value Ref Range    T-SPOT TB Screening Test See result image under hyperlink    Hepatitis C antibody   Result Value Ref Range    Hepatitis C Ab Negative    CBC ONCOLOGY   Result Value Ref Range    WBC 7.49 3.90 - 12.70 K/uL    RBC 3.34 (L) 4.00 - 5.40 M/uL    Hemoglobin 9.2 (L) 12.0 - 16.0 g/dL    Hematocrit 30.7 (L) 37.0 - 48.5 %    Mean Corpuscular Volume 92 82 - 98 fL    Mean Corpuscular Hemoglobin 27.5 27.0 - 31.0 pg    Mean Corpuscular Hemoglobin Conc 30.0 (L) 32.0 - 36.0 g/dL    RDW 13.9 11.5 - 14.5 %    Platelets 304 150 - 350 K/uL    MPV 10.5 9.2 - 12.9 fL    Gran # (ANC) 4.2 1.8 - 7.7 K/uL    Immature Grans (Abs) 0.03 0.00 - 0.04 K/uL   Immunofixation electrophoresis   Result Value Ref Range    Total Protein, Electrophoresis 7.8 6.3 - 8.2 g/dL    Albumin, Electrophoresis 3.25 (L) 3.75 - 5.01 g/dL    Alpha 1-Globulin, Electrophoresis 0.55 (H) 0.19 - 0.46 g/dL    Alpha 2-Globulin,  Electrophoresis 1.02 0.48 - 1.05 g/dL    Beta-Globulin, Electrophoresis 0.90 0.48 - 1.10 g/dL    Gamma-Globulin, Electrophoresis 2.07 (H) 0.62 - 1.51 g/dL    Immunofix Interp. BIB Done     IgG - Serum 1980 (H) 768 - 1632 mg/dL    IgA 287 68 - 408 mg/dL    IgM 248 35 - 263 mg/dL    SPE Interp. See Note     EER Monoclonal Protein Detect See Note    Lactate dehydrogenase   Result Value Ref Range     313 - 618 U/L   Haptoglobin   Result Value Ref Range    Haptoglobin 338 (H) 30 - 200 mg/dL   Iron and TIBC   Result Value Ref Range    Iron 29 (L) 30 - 160 ug/dL    TIBC 215 (L) 265 - 497 ug/dL    Iron Saturation 13 (L) 20 - 50 %   C-reactive protein   Result Value Ref Range    CRP 6.10 (H) 0.00 - 0.90 mg/dL   Sedimentation rate   Result Value Ref Range    Sed Rate 65 (H) 0 - 19 mm/Hr                               Pt  State, joint  Dislocating joints                    Assessment:         Encounter Diagnoses   Name Primary?    CVID (common variable immunodeficiency) Yes    Ankylosing spondylitis, unspecified site of spine     Rheumatoid arthritis of hand, unspecified laterality, unspecified rheumatoid factor presence     Chronic pain syndrome     Failure to thrive in adult     Systemic lupus erythematosus, unspecified SLE type, unspecified organ involvement status     Flu syndrome     Rheumatoid arthritis, involving unspecified site, unspecified rheumatoid factor presence     Psoriatic arthritis          Plan:   CVID (common variable immunodeficiency)  -     hydroxychloroquine (PLAQUENIL) 200 mg tablet; Take 1 tablet (200 mg total) by mouth 2 (two) times daily. for 60 doses  Dispense: 60 tablet; Refill: 6  -     fluconazole (DIFLUCAN) 150 MG Tab; Take 1 tablet (150 mg total) by mouth once daily. for 7 days  Dispense: 7 tablet; Refill: 3  -     baloxavir marboxiL (XOFLUZA) 40 mg tablet; Take 1 tablet (40 mg total) by mouth once. for 1 dose  Dispense: 1 tablet; Refill: 0  -     certolizumab pegoL 400 mg (200 mg  x 2 vials) Kit kit; Inject 400 mg (2 mLs total) into the skin every 14 (fourteen) days.  Dispense: 800 mg; Refill: 11  -     ondansetron (ZOFRAN-ODT) 4 MG TbDL; DISSOLVE 1 TO 2 TABLETS BY MOUTH EVERY 6 HOURS AS NEEDED  Dispense: 60 tablet; Refill: 4  -     thyroid, pork, (ARMOUR THYROID) 180 mg Tab; Take 1 tablet by mouth once daily.  Dispense: 30 tablet; Refill: 4  -     fluocinonide (LIDEX) 0.05 % ointment; APPLY TO THE AFFECTED NAILS AND SKIN TWICE DAILY  Dispense: 60 g; Refill: 3  -     promethazine (PHENERGAN) 25 MG tablet; Take 1 tablet (25 mg total) by mouth every 4 (four) hours as needed.  Dispense: 45 tablet; Refill: 3    Ankylosing spondylitis, unspecified site of spine  -     hydroxychloroquine (PLAQUENIL) 200 mg tablet; Take 1 tablet (200 mg total) by mouth 2 (two) times daily. for 60 doses  Dispense: 60 tablet; Refill: 6  -     fluconazole (DIFLUCAN) 150 MG Tab; Take 1 tablet (150 mg total) by mouth once daily. for 7 days  Dispense: 7 tablet; Refill: 3  -     baloxavir marboxiL (XOFLUZA) 40 mg tablet; Take 1 tablet (40 mg total) by mouth once. for 1 dose  Dispense: 1 tablet; Refill: 0  -     certolizumab pegoL 400 mg (200 mg x 2 vials) Kit kit; Inject 400 mg (2 mLs total) into the skin every 14 (fourteen) days.  Dispense: 800 mg; Refill: 11  -     ondansetron (ZOFRAN-ODT) 4 MG TbDL; DISSOLVE 1 TO 2 TABLETS BY MOUTH EVERY 6 HOURS AS NEEDED  Dispense: 60 tablet; Refill: 4  -     thyroid, pork, (ARMOUR THYROID) 180 mg Tab; Take 1 tablet by mouth once daily.  Dispense: 30 tablet; Refill: 4  -     fluocinonide (LIDEX) 0.05 % ointment; APPLY TO THE AFFECTED NAILS AND SKIN TWICE DAILY  Dispense: 60 g; Refill: 3  -     promethazine (PHENERGAN) 25 MG tablet; Take 1 tablet (25 mg total) by mouth every 4 (four) hours as needed.  Dispense: 45 tablet; Refill: 3    Rheumatoid arthritis of hand, unspecified laterality, unspecified rheumatoid factor presence  -     hydroxychloroquine (PLAQUENIL) 200 mg tablet; Take 1  tablet (200 mg total) by mouth 2 (two) times daily. for 60 doses  Dispense: 60 tablet; Refill: 6  -     fluconazole (DIFLUCAN) 150 MG Tab; Take 1 tablet (150 mg total) by mouth once daily. for 7 days  Dispense: 7 tablet; Refill: 3  -     baloxavir marboxiL (XOFLUZA) 40 mg tablet; Take 1 tablet (40 mg total) by mouth once. for 1 dose  Dispense: 1 tablet; Refill: 0  -     certolizumab pegoL 400 mg (200 mg x 2 vials) Kit kit; Inject 400 mg (2 mLs total) into the skin every 14 (fourteen) days.  Dispense: 800 mg; Refill: 11  -     ondansetron (ZOFRAN-ODT) 4 MG TbDL; DISSOLVE 1 TO 2 TABLETS BY MOUTH EVERY 6 HOURS AS NEEDED  Dispense: 60 tablet; Refill: 4  -     thyroid, pork, (ARMOUR THYROID) 180 mg Tab; Take 1 tablet by mouth once daily.  Dispense: 30 tablet; Refill: 4  -     fluocinonide (LIDEX) 0.05 % ointment; APPLY TO THE AFFECTED NAILS AND SKIN TWICE DAILY  Dispense: 60 g; Refill: 3  -     promethazine (PHENERGAN) 25 MG tablet; Take 1 tablet (25 mg total) by mouth every 4 (four) hours as needed.  Dispense: 45 tablet; Refill: 3    Chronic pain syndrome  -     hydroxychloroquine (PLAQUENIL) 200 mg tablet; Take 1 tablet (200 mg total) by mouth 2 (two) times daily. for 60 doses  Dispense: 60 tablet; Refill: 6  -     fluconazole (DIFLUCAN) 150 MG Tab; Take 1 tablet (150 mg total) by mouth once daily. for 7 days  Dispense: 7 tablet; Refill: 3  -     baloxavir marboxiL (XOFLUZA) 40 mg tablet; Take 1 tablet (40 mg total) by mouth once. for 1 dose  Dispense: 1 tablet; Refill: 0  -     certolizumab pegoL 400 mg (200 mg x 2 vials) Kit kit; Inject 400 mg (2 mLs total) into the skin every 14 (fourteen) days.  Dispense: 800 mg; Refill: 11  -     ondansetron (ZOFRAN-ODT) 4 MG TbDL; DISSOLVE 1 TO 2 TABLETS BY MOUTH EVERY 6 HOURS AS NEEDED  Dispense: 60 tablet; Refill: 4  -     thyroid, pork, (ARMOUR THYROID) 180 mg Tab; Take 1 tablet by mouth once daily.  Dispense: 30 tablet; Refill: 4  -     fluocinonide (LIDEX) 0.05 % ointment;  APPLY TO THE AFFECTED NAILS AND SKIN TWICE DAILY  Dispense: 60 g; Refill: 3  -     promethazine (PHENERGAN) 25 MG tablet; Take 1 tablet (25 mg total) by mouth every 4 (four) hours as needed.  Dispense: 45 tablet; Refill: 3    Failure to thrive in adult  -     hydroxychloroquine (PLAQUENIL) 200 mg tablet; Take 1 tablet (200 mg total) by mouth 2 (two) times daily. for 60 doses  Dispense: 60 tablet; Refill: 6  -     fluconazole (DIFLUCAN) 150 MG Tab; Take 1 tablet (150 mg total) by mouth once daily. for 7 days  Dispense: 7 tablet; Refill: 3  -     baloxavir marboxiL (XOFLUZA) 40 mg tablet; Take 1 tablet (40 mg total) by mouth once. for 1 dose  Dispense: 1 tablet; Refill: 0  -     ondansetron (ZOFRAN-ODT) 4 MG TbDL; DISSOLVE 1 TO 2 TABLETS BY MOUTH EVERY 6 HOURS AS NEEDED  Dispense: 60 tablet; Refill: 4  -     thyroid, pork, (ARMOUR THYROID) 180 mg Tab; Take 1 tablet by mouth once daily.  Dispense: 30 tablet; Refill: 4  -     fluocinonide (LIDEX) 0.05 % ointment; APPLY TO THE AFFECTED NAILS AND SKIN TWICE DAILY  Dispense: 60 g; Refill: 3  -     promethazine (PHENERGAN) 25 MG tablet; Take 1 tablet (25 mg total) by mouth every 4 (four) hours as needed.  Dispense: 45 tablet; Refill: 3    Systemic lupus erythematosus, unspecified SLE type, unspecified organ involvement status  -     hydroxychloroquine (PLAQUENIL) 200 mg tablet; Take 1 tablet (200 mg total) by mouth 2 (two) times daily. for 60 doses  Dispense: 60 tablet; Refill: 6  -     fluconazole (DIFLUCAN) 150 MG Tab; Take 1 tablet (150 mg total) by mouth once daily. for 7 days  Dispense: 7 tablet; Refill: 3  -     baloxavir marboxiL (XOFLUZA) 40 mg tablet; Take 1 tablet (40 mg total) by mouth once. for 1 dose  Dispense: 1 tablet; Refill: 0  -     certolizumab pegoL 400 mg (200 mg x 2 vials) Kit kit; Inject 400 mg (2 mLs total) into the skin every 14 (fourteen) days.  Dispense: 800 mg; Refill: 11  -     ondansetron (ZOFRAN-ODT) 4 MG TbDL; DISSOLVE 1 TO 2 TABLETS BY MOUTH  EVERY 6 HOURS AS NEEDED  Dispense: 60 tablet; Refill: 4  -     thyroid, pork, (ARMOUR THYROID) 180 mg Tab; Take 1 tablet by mouth once daily.  Dispense: 30 tablet; Refill: 4  -     fluocinonide (LIDEX) 0.05 % ointment; APPLY TO THE AFFECTED NAILS AND SKIN TWICE DAILY  Dispense: 60 g; Refill: 3  -     promethazine (PHENERGAN) 25 MG tablet; Take 1 tablet (25 mg total) by mouth every 4 (four) hours as needed.  Dispense: 45 tablet; Refill: 3    Flu syndrome  -     hydroxychloroquine (PLAQUENIL) 200 mg tablet; Take 1 tablet (200 mg total) by mouth 2 (two) times daily. for 60 doses  Dispense: 60 tablet; Refill: 6  -     fluconazole (DIFLUCAN) 150 MG Tab; Take 1 tablet (150 mg total) by mouth once daily. for 7 days  Dispense: 7 tablet; Refill: 3  -     baloxavir marboxiL (XOFLUZA) 40 mg tablet; Take 1 tablet (40 mg total) by mouth once. for 1 dose  Dispense: 1 tablet; Refill: 0  -     certolizumab pegoL 400 mg (200 mg x 2 vials) Kit kit; Inject 400 mg (2 mLs total) into the skin every 14 (fourteen) days.  Dispense: 800 mg; Refill: 11  -     ondansetron (ZOFRAN-ODT) 4 MG TbDL; DISSOLVE 1 TO 2 TABLETS BY MOUTH EVERY 6 HOURS AS NEEDED  Dispense: 60 tablet; Refill: 4  -     thyroid, pork, (ARMOUR THYROID) 180 mg Tab; Take 1 tablet by mouth once daily.  Dispense: 30 tablet; Refill: 4  -     fluocinonide (LIDEX) 0.05 % ointment; APPLY TO THE AFFECTED NAILS AND SKIN TWICE DAILY  Dispense: 60 g; Refill: 3  -     promethazine (PHENERGAN) 25 MG tablet; Take 1 tablet (25 mg total) by mouth every 4 (four) hours as needed.  Dispense: 45 tablet; Refill: 3    Rheumatoid arthritis, involving unspecified site, unspecified rheumatoid factor presence  -     ondansetron (ZOFRAN-ODT) 4 MG TbDL; DISSOLVE 1 TO 2 TABLETS BY MOUTH EVERY 6 HOURS AS NEEDED  Dispense: 60 tablet; Refill: 4  -     thyroid, pork, (ARMOUR THYROID) 180 mg Tab; Take 1 tablet by mouth once daily.  Dispense: 30 tablet; Refill: 4  -     fluocinonide (LIDEX) 0.05 % ointment;  APPLY TO THE AFFECTED NAILS AND SKIN TWICE DAILY  Dispense: 60 g; Refill: 3  -     promethazine (PHENERGAN) 25 MG tablet; Take 1 tablet (25 mg total) by mouth every 4 (four) hours as needed.  Dispense: 45 tablet; Refill: 3    Psoriatic arthritis  -     ondansetron (ZOFRAN-ODT) 4 MG TbDL; DISSOLVE 1 TO 2 TABLETS BY MOUTH EVERY 6 HOURS AS NEEDED  Dispense: 60 tablet; Refill: 4  -     thyroid, pork, (ARMOUR THYROID) 180 mg Tab; Take 1 tablet by mouth once daily.  Dispense: 30 tablet; Refill: 4  -     fluocinonide (LIDEX) 0.05 % ointment; APPLY TO THE AFFECTED NAILS AND SKIN TWICE DAILY  Dispense: 60 g; Refill: 3  -     promethazine (PHENERGAN) 25 MG tablet; Take 1 tablet (25 mg total) by mouth every 4 (four) hours as needed.  Dispense: 45 tablet; Refill: 3    over 50% of this visit was explain labs and possible disease states and course of treatments  The patient location is: home  The chief complaint leading to consultation is: Ra, AS, sle, cvid, on hospice  Visit type: Virtual visit with synchronous audio and video  Total time spent with patient: 55 min  Each patient to whom he or she provides medical services by telemedicine is:  (1) informed of the relationship between the physician and patient and the respective role of any other health care provider with respect to management of the patient; and (2) notified that he or she may decline to receive medical services by telemedicine and may withdraw from such care at any time.  Patient is on hospice however her joints continue to deform and dislocate due to the corona virus B 19 we will not start any aggressive treatment however will start Plaquenil 200 mg p.o. b.i.d. patient's weight according her to her bed scale as 140 lb the patient has failed Enbrel Humira and Remicade she has never taken Cimzia this may be an option for her also she is immediately gained about 30 lb started Diflucan she had a fever and chill and cough added  xoFluza she need refills on other  medication

## 2020-03-23 NOTE — TELEPHONE ENCOUNTER
Returned pharmacy call and informed was able to find a pharmacy that carries plaquenil. Nurse informed ok for pharmacy to fill two 20 mg tablets for a 40 mg dose. Pharmacy informed office that medication comes in blister packs and can not break open package.

## 2020-03-25 ENCOUNTER — DOCUMENTATION ONLY (OUTPATIENT)
Dept: RHEUMATOLOGY | Facility: CLINIC | Age: 45
End: 2020-03-25

## 2020-03-25 ENCOUNTER — TELEPHONE (OUTPATIENT)
Dept: PHARMACY | Facility: CLINIC | Age: 45
End: 2020-03-25

## 2020-03-25 NOTE — TELEPHONE ENCOUNTER
Informed Patient  that [facility] received prescription for Cimzia and prior authorization is required.  [facility] will be back in touch once insurance determination is received.

## 2020-04-14 NOTE — TELEPHONE ENCOUNTER
DOCUMENTATION ONLY:   Cimzia prior authorization approved until 12/31/20  CASE ID # [id number]  Estimated Co-Pay: $8.95

## 2020-05-12 ENCOUNTER — TELEPHONE (OUTPATIENT)
Dept: RHEUMATOLOGY | Facility: CLINIC | Age: 45
End: 2020-05-12

## 2020-05-12 NOTE — TELEPHONE ENCOUNTER
Dr Montaño contacted [facility] for clarification of Cimzi.   400 mg for week 0   400 mg for week 2   400 mg week 4   then 400 mg monthly.

## 2020-05-14 ENCOUNTER — PATIENT MESSAGE (OUTPATIENT)
Dept: RHEUMATOLOGY | Facility: CLINIC | Age: 45
End: 2020-05-14

## 2020-05-29 ENCOUNTER — TELEPHONE (OUTPATIENT)
Dept: RHEUMATOLOGY | Facility: CLINIC | Age: 45
End: 2020-05-29

## 2020-05-29 NOTE — TELEPHONE ENCOUNTER
Patient advised that Dr. Montaño will be out of the office on 6/25/2020. She has been rescheduled for a video visit on 6/4/2020 at 2pm. Patient was instructed that she can contact the nurse via her portal or by phone to discuss the questions about her medications

## 2020-06-04 ENCOUNTER — OFFICE VISIT (OUTPATIENT)
Dept: RHEUMATOLOGY | Facility: CLINIC | Age: 45
End: 2020-06-04
Payer: MEDICARE

## 2020-06-04 VITALS — BODY MASS INDEX: 22.78 KG/M2 | WEIGHT: 116 LBS | HEIGHT: 60 IN

## 2020-06-04 DIAGNOSIS — M06.9 RHEUMATOID ARTHRITIS, INVOLVING UNSPECIFIED SITE, UNSPECIFIED RHEUMATOID FACTOR PRESENCE: ICD-10-CM

## 2020-06-04 DIAGNOSIS — D83.9 CVID (COMMON VARIABLE IMMUNODEFICIENCY): Primary | ICD-10-CM

## 2020-06-04 DIAGNOSIS — M32.9 SYSTEMIC LUPUS ERYTHEMATOSUS, UNSPECIFIED SLE TYPE, UNSPECIFIED ORGAN INVOLVEMENT STATUS: ICD-10-CM

## 2020-06-04 DIAGNOSIS — M45.9 ANKYLOSING SPONDYLITIS, UNSPECIFIED SITE OF SPINE: ICD-10-CM

## 2020-06-04 DIAGNOSIS — M06.9 RHEUMATOID ARTHRITIS OF HAND, UNSPECIFIED LATERALITY, UNSPECIFIED RHEUMATOID FACTOR PRESENCE: ICD-10-CM

## 2020-06-04 DIAGNOSIS — R62.7 FAILURE TO THRIVE IN ADULT: ICD-10-CM

## 2020-06-04 DIAGNOSIS — G89.4 CHRONIC PAIN SYNDROME: ICD-10-CM

## 2020-06-04 PROCEDURE — 99214 OFFICE O/P EST MOD 30 MIN: CPT | Mod: 95,GW,ICN, | Performed by: INTERNAL MEDICINE

## 2020-06-04 PROCEDURE — 99214 PR OFFICE/OUTPT VISIT, EST, LEVL IV, 30-39 MIN: ICD-10-PCS | Mod: 95,GW,ICN, | Performed by: INTERNAL MEDICINE

## 2020-06-04 NOTE — PROGRESS NOTES
Subjective:       Patient ID: Holli Kurtz is a 44 y.o. female.    Chief Complaint: Disease Management (cvid) and Rheumatoid Arthritis     Follow up: Ra,AS, sle Multiple doing poorly, pt is on hospice, we will start treatment as a palliative care. The pain medication has not covering her pain.she has hip contracture, elbow contracture,  myosititis, severe anemia,wasting away,she has noticable teeth shifting and bone growth. She has severe difficulty and is bed bound.  She is doing poorly. Patient complains of arthralgias and myalgias for which has been present for a few years. Pain is located in multiple joints, both shoulder(s), both elbow(s), both wrist(s), both MCP(s): 1st, 2nd, 3rd, 4th and 5th, both PIP(s): 1st, 2nd, 3rd, 4th and 5th, both DIP(s): 1st and 2nd, both hip(s), both knee(s) and both MTP(s): 1st, 2nd, 3rd, 4th and 5th, is described as aching, pulsating, shooting and throbbing, and is constant,severe debilitating .  Pt has fever every day, can not feed herself, cook clean bath toilet or walk,            She complains of joint swelling. Associated symptoms include fatigue and myalgias.         Review of Systems   Constitutional: Positive for activity change, appetite change, fatigue and unexpected weight change. Negative for chills and diaphoresis.   HENT: Negative for congestion, dental problem, ear discharge, ear pain, facial swelling, mouth sores, nosebleeds, postnasal drip, rhinorrhea, sinus pressure, sneezing, sore throat, tinnitus and voice change.    Eyes: Negative for photophobia, pain, discharge, redness and itching.   Respiratory: Negative for apnea, cough, chest tightness, shortness of breath and wheezing.    Cardiovascular: Positive for leg swelling. Negative for chest pain and palpitations.   Gastrointestinal: Negative for abdominal distention, abdominal pain, constipation, diarrhea, nausea and vomiting.   Endocrine: Negative for cold intolerance, heat intolerance, polydipsia  and polyuria.   Genitourinary: Negative for decreased urine volume, difficulty urinating, flank pain, frequency, hematuria and urgency.   Musculoskeletal: Positive for arthralgias, back pain, gait problem, joint swelling, myalgias, neck pain and neck stiffness.   Skin: Negative for pallor, rash and wound.   Allergic/Immunologic: Negative for immunocompromised state.   Neurological: Positive for dizziness. Negative for tremors and numbness.   Hematological: Negative for adenopathy. Does not bruise/bleed easily.   Psychiatric/Behavioral: Negative for sleep disturbance. The patient is not nervous/anxious.          Objective:     Ht 5' (1.524 m)   Wt 52.6 kg (116 lb)   BMI 22.65 kg/m²      Physical Exam   Constitutional: She is oriented to person, place, and time. She appears distressed.   Neurological: She is alert and oriented to person, place, and time.   Psychiatric: Mood, affect and judgment normal.            Results for orders placed or performed in visit on 09/18/19   Reticulocytes   Result Value Ref Range    Retic 1.0 0.5 - 2.5 %   Hepatitis panel, acute   Result Value Ref Range    Hepatitis B Surface Ag Negative     Hep B C IgM Negative     Hep A IgM Negative     Hepatitis C Ab Negative    T-SPOT TB Screening Test   Result Value Ref Range    T-SPOT TB Screening Test See result image under hyperlink    Hepatitis C antibody   Result Value Ref Range    Hepatitis C Ab Negative    CBC ONCOLOGY   Result Value Ref Range    WBC 7.49 3.90 - 12.70 K/uL    RBC 3.34 (L) 4.00 - 5.40 M/uL    Hemoglobin 9.2 (L) 12.0 - 16.0 g/dL    Hematocrit 30.7 (L) 37.0 - 48.5 %    Mean Corpuscular Volume 92 82 - 98 fL    Mean Corpuscular Hemoglobin 27.5 27.0 - 31.0 pg    Mean Corpuscular Hemoglobin Conc 30.0 (L) 32.0 - 36.0 g/dL    RDW 13.9 11.5 - 14.5 %    Platelets 304 150 - 350 K/uL    MPV 10.5 9.2 - 12.9 fL    Gran # (ANC) 4.2 1.8 - 7.7 K/uL    Immature Grans (Abs) 0.03 0.00 - 0.04 K/uL   Immunofixation electrophoresis   Result  Value Ref Range    Total Protein, Electrophoresis 7.8 6.3 - 8.2 g/dL    Albumin, Electrophoresis 3.25 (L) 3.75 - 5.01 g/dL    Alpha 1-Globulin, Electrophoresis 0.55 (H) 0.19 - 0.46 g/dL    Alpha 2-Globulin, Electrophoresis 1.02 0.48 - 1.05 g/dL    Beta-Globulin, Electrophoresis 0.90 0.48 - 1.10 g/dL    Gamma-Globulin, Electrophoresis 2.07 (H) 0.62 - 1.51 g/dL    Immunofix Interp. BIB Done     IgG - Serum 1980 (H) 768 - 1632 mg/dL    IgA 287 68 - 408 mg/dL    IgM 248 35 - 263 mg/dL    SPE Interp. See Note     EER Monoclonal Protein Detect See Note    Lactate dehydrogenase   Result Value Ref Range     313 - 618 U/L   Haptoglobin   Result Value Ref Range    Haptoglobin 338 (H) 30 - 200 mg/dL   Iron and TIBC   Result Value Ref Range    Iron 29 (L) 30 - 160 ug/dL    TIBC 215 (L) 265 - 497 ug/dL    Iron Saturation 13 (L) 20 - 50 %   C-reactive protein   Result Value Ref Range    CRP 6.10 (H) 0.00 - 0.90 mg/dL   Sedimentation rate   Result Value Ref Range    Sed Rate 65 (H) 0 - 19 mm/Hr               reviewed labs with patient during this visit     Assessment:         Encounter Diagnoses   Name Primary?    CVID (common variable immunodeficiency) Yes    Ankylosing spondylitis, unspecified site of spine     Rheumatoid arthritis of hand, unspecified laterality, unspecified rheumatoid factor presence     Chronic pain syndrome     Failure to thrive in adult     Systemic lupus erythematosus, unspecified SLE type, unspecified organ involvement status     Rheumatoid arthritis, involving unspecified site, unspecified rheumatoid factor presence          Plan:   CVID (common variable immunodeficiency)  -     CBC auto differential; Future; Expected date: 06/04/2020  -     Comprehensive metabolic panel; Future; Expected date: 06/04/2020  -     Sedimentation rate; Future; Expected date: 06/04/2020  -     C-Reactive Protein; Future; Expected date: 06/04/2020  -     TSH; Future; Expected date: 06/04/2020  -     T4, free;  Future; Expected date: 06/04/2020  -     T3, free; Future; Expected date: 06/04/2020  -     Thyroid peroxidase antibody; Future; Expected date: 06/04/2020  -     Anti-thyroglobulin antibody; Future; Expected date: 06/04/2020    Ankylosing spondylitis, unspecified site of spine  -     CBC auto differential; Future; Expected date: 06/04/2020  -     Comprehensive metabolic panel; Future; Expected date: 06/04/2020  -     Sedimentation rate; Future; Expected date: 06/04/2020  -     C-Reactive Protein; Future; Expected date: 06/04/2020  -     TSH; Future; Expected date: 06/04/2020  -     T4, free; Future; Expected date: 06/04/2020  -     T3, free; Future; Expected date: 06/04/2020  -     Thyroid peroxidase antibody; Future; Expected date: 06/04/2020  -     Anti-thyroglobulin antibody; Future; Expected date: 06/04/2020    Rheumatoid arthritis of hand, unspecified laterality, unspecified rheumatoid factor presence  -     CBC auto differential; Future; Expected date: 06/04/2020  -     Comprehensive metabolic panel; Future; Expected date: 06/04/2020  -     Sedimentation rate; Future; Expected date: 06/04/2020  -     C-Reactive Protein; Future; Expected date: 06/04/2020  -     TSH; Future; Expected date: 06/04/2020  -     T4, free; Future; Expected date: 06/04/2020  -     T3, free; Future; Expected date: 06/04/2020  -     Thyroid peroxidase antibody; Future; Expected date: 06/04/2020  -     Anti-thyroglobulin antibody; Future; Expected date: 06/04/2020    Chronic pain syndrome  -     CBC auto differential; Future; Expected date: 06/04/2020  -     Comprehensive metabolic panel; Future; Expected date: 06/04/2020  -     Sedimentation rate; Future; Expected date: 06/04/2020  -     C-Reactive Protein; Future; Expected date: 06/04/2020  -     TSH; Future; Expected date: 06/04/2020  -     T4, free; Future; Expected date: 06/04/2020  -     T3, free; Future; Expected date: 06/04/2020  -     Thyroid peroxidase antibody; Future; Expected  date: 06/04/2020  -     Anti-thyroglobulin antibody; Future; Expected date: 06/04/2020    Failure to thrive in adult  -     CBC auto differential; Future; Expected date: 06/04/2020  -     Comprehensive metabolic panel; Future; Expected date: 06/04/2020  -     Sedimentation rate; Future; Expected date: 06/04/2020  -     C-Reactive Protein; Future; Expected date: 06/04/2020  -     TSH; Future; Expected date: 06/04/2020  -     T4, free; Future; Expected date: 06/04/2020  -     T3, free; Future; Expected date: 06/04/2020  -     Thyroid peroxidase antibody; Future; Expected date: 06/04/2020  -     Anti-thyroglobulin antibody; Future; Expected date: 06/04/2020    Systemic lupus erythematosus, unspecified SLE type, unspecified organ involvement status  -     CBC auto differential; Future; Expected date: 06/04/2020  -     Comprehensive metabolic panel; Future; Expected date: 06/04/2020  -     Sedimentation rate; Future; Expected date: 06/04/2020  -     C-Reactive Protein; Future; Expected date: 06/04/2020  -     TSH; Future; Expected date: 06/04/2020  -     T4, free; Future; Expected date: 06/04/2020  -     T3, free; Future; Expected date: 06/04/2020  -     Thyroid peroxidase antibody; Future; Expected date: 06/04/2020  -     Anti-thyroglobulin antibody; Future; Expected date: 06/04/2020    Rheumatoid arthritis, involving unspecified site, unspecified rheumatoid factor presence  -     CBC auto differential; Future; Expected date: 06/04/2020  -     Comprehensive metabolic panel; Future; Expected date: 06/04/2020  -     Sedimentation rate; Future; Expected date: 06/04/2020  -     C-Reactive Protein; Future; Expected date: 06/04/2020  -     TSH; Future; Expected date: 06/04/2020  -     T4, free; Future; Expected date: 06/04/2020  -     T3, free; Future; Expected date: 06/04/2020  -     Thyroid peroxidase antibody; Future; Expected date: 06/04/2020  -     Anti-thyroglobulin antibody; Future; Expected date: 06/04/2020    over  50% of this visit discussing starting streatment to help with pain and suffering and that Cimzia or any other arthritis medication will not cure  But may ease suffering.      The patient location is: home  The chief complaint leading to consultation is: Ra, AS, sle, cvid, on hospice  Visit type: Virtual visit with synchronous audio and video  Total time spent with patient: 30 min    Each patient to whom he or she provides medical services by telemedicine is:  (1) informed of the relationship between the physician and patient and the respective role of any other health care provider with respect to management of the patient; and (2) notified that he or she may decline to receive medical services by telemedicine and may withdraw from such care at any time.

## 2020-07-08 ENCOUNTER — TELEPHONE (OUTPATIENT)
Dept: PHARMACY | Facility: CLINIC | Age: 45
End: 2020-07-08

## 2020-08-26 ENCOUNTER — TELEPHONE (OUTPATIENT)
Dept: RHEUMATOLOGY | Facility: CLINIC | Age: 45
End: 2020-08-26

## 2020-08-26 NOTE — TELEPHONE ENCOUNTER
----- Message from Samantha Lockett, [place] sent at 8/26/2020  9:15 AM CDT -----  Regarding: Cimzia  Good morning Dr. Montaño and staff,     I wanted to reach out and let you know that Ms. Kurtz is still holding off on starting Cimzia. Both her and hospice were concerned about whether Cimzia would be palliative or curative treatment given her current state. We will continue to follow up with her to see if she has started, but I wanted to keep you in the loop. Please let me know how I can further assist and have a great day!    Thank you,   Samantha Lockett, PharmD  [facility]  [phone]

## 2020-10-20 ENCOUNTER — OFFICE VISIT (OUTPATIENT)
Dept: RHEUMATOLOGY | Facility: CLINIC | Age: 45
End: 2020-10-20
Payer: MEDICARE

## 2020-10-20 DIAGNOSIS — L40.50 PSORIATIC ARTHRITIS: ICD-10-CM

## 2020-10-20 DIAGNOSIS — D83.9 CVID (COMMON VARIABLE IMMUNODEFICIENCY): Primary | ICD-10-CM

## 2020-10-20 DIAGNOSIS — M45.9 ANKYLOSING SPONDYLITIS, UNSPECIFIED SITE OF SPINE: ICD-10-CM

## 2020-10-20 DIAGNOSIS — M32.9 SYSTEMIC LUPUS ERYTHEMATOSUS, UNSPECIFIED SLE TYPE, UNSPECIFIED ORGAN INVOLVEMENT STATUS: ICD-10-CM

## 2020-10-20 DIAGNOSIS — G89.4 CHRONIC PAIN SYNDROME: ICD-10-CM

## 2020-10-20 DIAGNOSIS — D82.9: ICD-10-CM

## 2020-10-20 PROCEDURE — 99215 OFFICE O/P EST HI 40 MIN: CPT | Mod: 95,,, | Performed by: INTERNAL MEDICINE

## 2020-10-20 PROCEDURE — 99215 PR OFFICE/OUTPT VISIT, EST, LEVL V, 40-54 MIN: ICD-10-PCS | Mod: 95,,, | Performed by: INTERNAL MEDICINE

## 2020-10-20 NOTE — PROGRESS NOTES
Subjective:       Patient ID: Holli Kurtz is a 44 y.o. female.    Chief Complaint: No chief complaint on file.     Follow up: Ra,AS, PSA  Multiple doing poorly, pt is on hospice, we will start treatment as a palliative care.she a thick curved nail. She is on  pain medication ,she has hip contracture, elbow contracture,  myosititis, severe anemia,wasting away,she has noticable teeth shifting and bone growth. She has severe difficulty and is bed bound.  She is doing poorly. Patient complains of arthralgias and myalgias for which has been present for a few years. Pain is located in multiple joints, both shoulder(s), both elbow(s), both wrist(s), both MCP(s): 1st, 2nd, 3rd, 4th and 5th, both PIP(s): 1st, 2nd, 3rd, 4th and 5th, both DIP(s): 1st and 2nd, both hip(s), both knee(s) and both MTP(s): 1st, 2nd, 3rd, 4th and 5th, is described as aching, pulsating, shooting and throbbing, and is constant,severe debilitating .              She complains of joint swelling. Associated symptoms include fatigue and myalgias.         Review of Systems   Constitutional: Positive for activity change, appetite change, fatigue and unexpected weight change. Negative for chills and diaphoresis.   HENT: Negative for congestion, dental problem, ear discharge, ear pain, facial swelling, mouth sores, nosebleeds, postnasal drip, rhinorrhea, sinus pressure, sneezing, sore throat, tinnitus and voice change.    Eyes: Negative for photophobia, pain, discharge, redness and itching.   Respiratory: Negative for apnea, cough, chest tightness, shortness of breath and wheezing.    Cardiovascular: Positive for leg swelling. Negative for chest pain and palpitations.   Gastrointestinal: Negative for abdominal distention, abdominal pain, constipation, diarrhea, nausea and vomiting.   Endocrine: Negative for cold intolerance, heat intolerance, polydipsia and polyuria.   Genitourinary: Negative for decreased urine volume, difficulty urinating, flank  pain, frequency, hematuria and urgency.   Musculoskeletal: Positive for arthralgias, back pain, gait problem, joint swelling, myalgias, neck pain and neck stiffness.   Skin: Negative for pallor, rash and wound.   Allergic/Immunologic: Negative for immunocompromised state.   Neurological: Positive for dizziness. Negative for tremors and numbness.   Hematological: Negative for adenopathy. Does not bruise/bleed easily.   Psychiatric/Behavioral: Negative for sleep disturbance. The patient is not nervous/anxious.          Objective:     There were no vitals taken for this visit.     Physical Exam   Constitutional: She is oriented to person, place, and time. She appears distressed.   Neurological: She is alert and oriented to person, place, and time.   Psychiatric: Mood, affect and judgment normal.            Results for orders placed or performed in visit on 09/18/19   Reticulocytes   Result Value Ref Range    Retic 1.0 0.5 - 2.5 %   Hepatitis panel, acute   Result Value Ref Range    Hepatitis B Surface Ag Negative     Hep B C IgM Negative     Hep A IgM Negative     Hepatitis C Ab Negative    T-SPOT TB Screening Test   Result Value Ref Range    T-SPOT TB Screening Test See result image under hyperlink    Hepatitis C antibody   Result Value Ref Range    Hepatitis C Ab Negative    CBC ONCOLOGY   Result Value Ref Range    WBC 7.49 3.90 - 12.70 K/uL    RBC 3.34 (L) 4.00 - 5.40 M/uL    Hemoglobin 9.2 (L) 12.0 - 16.0 g/dL    Hematocrit 30.7 (L) 37.0 - 48.5 %    Mean Corpuscular Volume 92 82 - 98 fL    Mean Corpuscular Hemoglobin 27.5 27.0 - 31.0 pg    Mean Corpuscular Hemoglobin Conc 30.0 (L) 32.0 - 36.0 g/dL    RDW 13.9 11.5 - 14.5 %    Platelets 304 150 - 350 K/uL    MPV 10.5 9.2 - 12.9 fL    Gran # (ANC) 4.2 1.8 - 7.7 K/uL    Immature Grans (Abs) 0.03 0.00 - 0.04 K/uL   Immunofixation electrophoresis   Result Value Ref Range    Total Protein, Electrophoresis 7.8 6.3 - 8.2 g/dL    Albumin, Electrophoresis 3.25 (L) 3.75 -  5.01 g/dL    Alpha 1-Globulin, Electrophoresis 0.55 (H) 0.19 - 0.46 g/dL    Alpha 2-Globulin, Electrophoresis 1.02 0.48 - 1.05 g/dL    Beta-Globulin, Electrophoresis 0.90 0.48 - 1.10 g/dL    Gamma-Globulin, Electrophoresis 2.07 (H) 0.62 - 1.51 g/dL    Immunofix Interp. BIB Done     IgG - Serum 1980 (H) 768 - 1632 mg/dL    IgA 287 68 - 408 mg/dL    IgM 248 35 - 263 mg/dL    SPE Interp. See Note     EER Monoclonal Protein Detect See Note    Lactate dehydrogenase   Result Value Ref Range     313 - 618 U/L   Haptoglobin   Result Value Ref Range    Haptoglobin 338 (H) 30 - 200 mg/dL   Iron and TIBC   Result Value Ref Range    Iron 29 (L) 30 - 160 ug/dL    TIBC 215 (L) 265 - 497 ug/dL    Iron Saturation 13 (L) 20 - 50 %   C-reactive protein   Result Value Ref Range    CRP 6.10 (H) 0.00 - 0.90 mg/dL   Sedimentation rate   Result Value Ref Range    Sed Rate 65 (H) 0 - 19 mm/Hr               reviewed labs with patient during this visit     Assessment:         Encounter Diagnoses   Name Primary?    CVID (common variable immunodeficiency) Yes    Ankylosing spondylitis, unspecified site of spine     Chronic pain syndrome     Psoriatic arthritis     Systemic lupus erythematosus, unspecified SLE type, unspecified organ involvement status     Immunodeficiency associated with major defect          Plan:   CVID (common variable immunodeficiency)    Ankylosing spondylitis, unspecified site of spine    Chronic pain syndrome  -     apremilast (OTEZLA) 30 mg Tab; Take 1 tablet (30 mg total) by mouth 2 (two) times daily.  Dispense: 60 tablet; Refill: 12    Psoriatic arthritis  -     apremilast (OTEZLA) 30 mg Tab; Take 1 tablet (30 mg total) by mouth 2 (two) times daily.  Dispense: 60 tablet; Refill: 12    Systemic lupus erythematosus, unspecified SLE type, unspecified organ involvement status    Immunodeficiency associated with major defect    over 50% of this visit discussing we will start  otezla to help with psa and  skin    The patient location is: home  The chief complaint leading to consultation is:Psa,  Ra, AS, sle, cvid, on hospice  Visit type: Virtual visit with synchronous audio and video  Total time spent with patient: 40 min    Each patient to whom he or she provides medical services by telemedicine is:  (1) informed of the relationship between the physician and patient and the respective role of any other health care provider with respect to management of the patient; and (2) notified that he or she may decline to receive medical services by telemedicine and may withdraw from such care at any time.

## 2020-10-21 ENCOUNTER — SPECIALTY PHARMACY (OUTPATIENT)
Dept: PHARMACY | Facility: CLINIC | Age: 45
End: 2020-10-21

## 2020-12-23 ENCOUNTER — SPECIALTY PHARMACY (OUTPATIENT)
Dept: PHARMACY | Facility: CLINIC | Age: 45
End: 2020-12-23

## 2020-12-28 ENCOUNTER — PATIENT MESSAGE (OUTPATIENT)
Dept: PHARMACY | Facility: CLINIC | Age: 45
End: 2020-12-28

## 2021-02-09 ENCOUNTER — PATIENT MESSAGE (OUTPATIENT)
Dept: PHARMACY | Facility: CLINIC | Age: 46
End: 2021-02-09

## 2021-02-09 ENCOUNTER — TELEPHONE (OUTPATIENT)
Dept: PHARMACY | Facility: CLINIC | Age: 46
End: 2021-02-09

## 2021-04-08 ENCOUNTER — PATIENT MESSAGE (OUTPATIENT)
Dept: RHEUMATOLOGY | Facility: CLINIC | Age: 46
End: 2021-04-08

## 2021-05-10 ENCOUNTER — TELEPHONE (OUTPATIENT)
Dept: PHARMACY | Facility: CLINIC | Age: 46
End: 2021-05-10

## 2021-08-12 ENCOUNTER — TELEPHONE (OUTPATIENT)
Dept: PHARMACY | Facility: CLINIC | Age: 46
End: 2021-08-12

## 2024-10-28 ENCOUNTER — PATIENT MESSAGE (OUTPATIENT)
Dept: GASTROENTEROLOGY | Facility: CLINIC | Age: 49
End: 2024-10-28
Payer: MEDICARE